# Patient Record
Sex: MALE | Race: WHITE | NOT HISPANIC OR LATINO | URBAN - METROPOLITAN AREA
[De-identification: names, ages, dates, MRNs, and addresses within clinical notes are randomized per-mention and may not be internally consistent; named-entity substitution may affect disease eponyms.]

---

## 2017-11-03 ENCOUNTER — AMBULATORY SURGICAL CENTER (OUTPATIENT)
Dept: URBAN - METROPOLITAN AREA SURGERY 17 | Facility: SURGERY | Age: 62
End: 2017-11-03
Payer: COMMERCIAL

## 2017-11-03 ENCOUNTER — OFFICE (OUTPATIENT)
Dept: URBAN - METROPOLITAN AREA CLINIC 64 | Facility: CLINIC | Age: 62
End: 2017-11-03

## 2017-11-03 VITALS
OXYGEN SATURATION: 95 % | DIASTOLIC BLOOD PRESSURE: 46 MMHG | HEART RATE: 66 BPM | SYSTOLIC BLOOD PRESSURE: 70 MMHG | SYSTOLIC BLOOD PRESSURE: 97 MMHG | DIASTOLIC BLOOD PRESSURE: 41 MMHG | DIASTOLIC BLOOD PRESSURE: 76 MMHG | DIASTOLIC BLOOD PRESSURE: 44 MMHG | RESPIRATION RATE: 18 BRPM | OXYGEN SATURATION: 94 % | DIASTOLIC BLOOD PRESSURE: 53 MMHG | RESPIRATION RATE: 17 BRPM | SYSTOLIC BLOOD PRESSURE: 65 MMHG | HEART RATE: 68 BPM | OXYGEN SATURATION: 97 % | DIASTOLIC BLOOD PRESSURE: 68 MMHG | RESPIRATION RATE: 14 BRPM | SYSTOLIC BLOOD PRESSURE: 73 MMHG | HEART RATE: 58 BPM | SYSTOLIC BLOOD PRESSURE: 99 MMHG | WEIGHT: 185 LBS | OXYGEN SATURATION: 96 % | HEART RATE: 64 BPM | RESPIRATION RATE: 16 BRPM | TEMPERATURE: 98.1 F | OXYGEN SATURATION: 93 % | SYSTOLIC BLOOD PRESSURE: 135 MMHG | DIASTOLIC BLOOD PRESSURE: 57 MMHG | HEART RATE: 74 BPM | DIASTOLIC BLOOD PRESSURE: 50 MMHG | SYSTOLIC BLOOD PRESSURE: 75 MMHG | HEART RATE: 60 BPM | OXYGEN SATURATION: 98 % | SYSTOLIC BLOOD PRESSURE: 90 MMHG | RESPIRATION RATE: 22 BRPM | DIASTOLIC BLOOD PRESSURE: 78 MMHG | DIASTOLIC BLOOD PRESSURE: 62 MMHG | TEMPERATURE: 98 F | SYSTOLIC BLOOD PRESSURE: 86 MMHG | DIASTOLIC BLOOD PRESSURE: 65 MMHG | DIASTOLIC BLOOD PRESSURE: 58 MMHG | OXYGEN SATURATION: 92 % | HEART RATE: 69 BPM | HEIGHT: 68 IN | HEART RATE: 63 BPM | SYSTOLIC BLOOD PRESSURE: 92 MMHG | RESPIRATION RATE: 19 BRPM | RESPIRATION RATE: 20 BRPM | HEART RATE: 67 BPM | HEART RATE: 54 BPM | SYSTOLIC BLOOD PRESSURE: 125 MMHG | HEART RATE: 65 BPM

## 2017-11-03 DIAGNOSIS — K20.9 ESOPHAGITIS, UNSPECIFIED: ICD-10-CM

## 2017-11-03 DIAGNOSIS — K44.9 DIAPHRAGMATIC HERNIA WITHOUT OBSTRUCTION OR GANGRENE: ICD-10-CM

## 2017-11-03 DIAGNOSIS — Z12.11 ENCOUNTER FOR SCREENING FOR MALIGNANT NEOPLASM OF COLON: ICD-10-CM

## 2017-11-03 DIAGNOSIS — K22.70 BARRETT'S ESOPHAGUS WITHOUT DYSPLASIA: ICD-10-CM

## 2017-11-03 DIAGNOSIS — K57.30 DIVERTICULOSIS OF LARGE INTESTINE WITHOUT PERFORATION OR ABS: ICD-10-CM

## 2017-11-03 DIAGNOSIS — R13.10 DYSPHAGIA, UNSPECIFIED: ICD-10-CM

## 2017-11-03 DIAGNOSIS — K64.0 FIRST DEGREE HEMORRHOIDS: ICD-10-CM

## 2017-11-03 DIAGNOSIS — K21.9 GASTRO-ESOPHAGEAL REFLUX DISEASE WITHOUT ESOPHAGITIS: ICD-10-CM

## 2017-11-03 DIAGNOSIS — K25.9 GASTRIC ULCER, UNSPECIFIED AS ACUTE OR CHRONIC, WITHOUT HEMO: ICD-10-CM

## 2017-11-03 PROBLEM — R19.4 CHANGE IN BOWEL HABITS: Status: ACTIVE | Noted: 2017-11-03

## 2017-11-03 LAB
GI HISTOLOGY: A. UNSPECIFIED: (no result)
GI HISTOLOGY: B. UNSPECIFIED: (no result)
GI HISTOLOGY: C. UNSPECIFIED: (no result)
GI HISTOLOGY: D. SELECT: (no result)
GI HISTOLOGY: PDF REPORT: (no result)

## 2017-11-03 PROCEDURE — 45380 COLONOSCOPY AND BIOPSY: CPT | Performed by: INTERNAL MEDICINE

## 2017-11-03 PROCEDURE — 88305 TISSUE EXAM BY PATHOLOGIST: CPT | Performed by: INTERNAL MEDICINE

## 2017-11-03 PROCEDURE — 43239 EGD BIOPSY SINGLE/MULTIPLE: CPT | Mod: 59 | Performed by: INTERNAL MEDICINE

## 2017-11-03 RX ORDER — OMEPRAZOLE 20 MG/1
CAPSULE, DELAYED RELEASE ORAL
Qty: 90 | Refills: 3 | Status: ACTIVE

## 2017-11-03 RX ADMIN — LIDOCAINE HYDROCHLORIDE 50 MG: 10 INJECTION, SOLUTION EPIDURAL; INFILTRATION; INTRACAUDAL; PERINEURAL at 08:26

## 2017-11-03 RX ADMIN — PROPOFOL 25 MG: 10 INJECTION, EMULSION INTRAVENOUS at 08:36

## 2017-11-03 RX ADMIN — PROPOFOL 25 MG: 10 INJECTION, EMULSION INTRAVENOUS at 08:39

## 2017-11-03 RX ADMIN — PROPOFOL 25 MG: 10 INJECTION, EMULSION INTRAVENOUS at 08:46

## 2017-11-03 RX ADMIN — SODIUM CHLORIDE 800 ML: 900 IRRIGANT IRRIGATION at 08:49

## 2017-11-03 RX ADMIN — PROPOFOL 25 MG: 10 INJECTION, EMULSION INTRAVENOUS at 08:26

## 2017-11-03 RX ADMIN — PROPOFOL 25 MG: 10 INJECTION, EMULSION INTRAVENOUS at 08:30

## 2017-11-03 RX ADMIN — PROPOFOL 25 MG: 10 INJECTION, EMULSION INTRAVENOUS at 08:43

## 2017-11-03 RX ADMIN — PROPOFOL 25 MG: 10 INJECTION, EMULSION INTRAVENOUS at 08:32

## 2017-11-03 RX ADMIN — PROPOFOL 50 MG: 10 INJECTION, EMULSION INTRAVENOUS at 08:26

## 2017-11-03 NOTE — SERVICEHPINOTES
bypass existing patient for 10 year screening.  new patient forms reviewed and verified. Patient also mentions change in bowel habits, diarrhea at times and esophageal dysphagia. He states he was told he has some cervical osteophytes based on imaging,,,dysphagia has been a issue now over the past 1.5 yrs. He uses H2B longstanding and has not used PPI tx in the past.

## 2017-11-03 NOTE — SERVICENOTES
Patient understands associated risk, benefit of endoscopy including bleeding, infection, missed polyp or missed cancer, perforation, missed lesion, death. Withdrawal time was > / = 6 minutes.

Patient to call for bx's given diarrhea concerns,,,will start PPI and see how GERD and dysphagia responds. May need to consider barium swallow with tablet if persistent dysphagia and concern for cervical involvement,,,,today,,,his cervical esophagus appears patent and very easy to pass with scope. *********

## 2018-03-19 ENCOUNTER — OFFICE (OUTPATIENT)
Dept: URBAN - METROPOLITAN AREA CLINIC 75 | Facility: CLINIC | Age: 63
End: 2018-03-19

## 2018-03-19 VITALS
HEIGHT: 68 IN | SYSTOLIC BLOOD PRESSURE: 128 MMHG | HEART RATE: 86 BPM | WEIGHT: 185 LBS | DIASTOLIC BLOOD PRESSURE: 84 MMHG

## 2018-03-19 DIAGNOSIS — R13.10 DYSPHAGIA, UNSPECIFIED: ICD-10-CM

## 2018-03-19 DIAGNOSIS — K21.9 GASTRO-ESOPHAGEAL REFLUX DISEASE WITHOUT ESOPHAGITIS: ICD-10-CM

## 2018-03-19 DIAGNOSIS — K22.70 BARRETT'S ESOPHAGUS WITHOUT DYSPLASIA: ICD-10-CM

## 2018-03-19 PROCEDURE — 99213 OFFICE O/P EST LOW 20 MIN: CPT | Performed by: INTERNAL MEDICINE

## 2018-11-23 ENCOUNTER — TRANSCRIBE ORDERS (OUTPATIENT)
Dept: ADMINISTRATIVE | Facility: HOSPITAL | Age: 63
End: 2018-11-23

## 2018-11-27 ENCOUNTER — TRANSCRIBE ORDERS (OUTPATIENT)
Dept: ADMINISTRATIVE | Facility: HOSPITAL | Age: 63
End: 2018-11-27

## 2018-11-27 DIAGNOSIS — R07.9 CHEST PAIN, UNSPECIFIED TYPE: Primary | ICD-10-CM

## 2018-11-30 ENCOUNTER — HOSPITAL ENCOUNTER (OUTPATIENT)
Dept: NUCLEAR MEDICINE | Facility: HOSPITAL | Age: 63
Discharge: HOME | End: 2018-11-30

## 2018-11-30 DIAGNOSIS — R07.9 CHEST PAIN, UNSPECIFIED TYPE: ICD-10-CM

## 2018-11-30 PROCEDURE — 78452 HT MUSCLE IMAGE SPECT MULT: CPT | Performed by: INTERNAL MEDICINE

## 2018-11-30 PROCEDURE — 0 TECHNETIUM SESTAMIBI: Performed by: INTERNAL MEDICINE

## 2018-11-30 PROCEDURE — 93017 CV STRESS TEST TRACING ONLY: CPT

## 2018-11-30 PROCEDURE — A9500 TC99M SESTAMIBI: HCPCS | Performed by: INTERNAL MEDICINE

## 2018-11-30 PROCEDURE — 78452 HT MUSCLE IMAGE SPECT MULT: CPT

## 2018-11-30 PROCEDURE — 93018 CV STRESS TEST I&R ONLY: CPT | Performed by: INTERNAL MEDICINE

## 2018-11-30 RX ORDER — ALLOPURINOL 300 MG/1
300 TABLET ORAL DAILY
COMMUNITY

## 2018-11-30 RX ORDER — DOXAZOSIN 8 MG/1
8 TABLET ORAL NIGHTLY
COMMUNITY

## 2018-11-30 RX ORDER — OMEPRAZOLE 20 MG/1
20 CAPSULE, DELAYED RELEASE ORAL DAILY
COMMUNITY

## 2018-11-30 RX ORDER — SIMVASTATIN 40 MG
40 TABLET ORAL NIGHTLY
COMMUNITY
End: 2019-12-31

## 2018-11-30 RX ORDER — OLMESARTAN MEDOXOMIL 20 MG/1
20 TABLET ORAL DAILY
COMMUNITY
End: 2019-12-31

## 2018-11-30 RX ORDER — METOPROLOL TARTRATE 100 MG/1
100 TABLET ORAL 2 TIMES DAILY
COMMUNITY

## 2018-11-30 RX ADMIN — TECHNETIUM TC 99M SESTAMIBI 1 DOSE: 1 INJECTION INTRAVENOUS at 07:48

## 2018-11-30 RX ADMIN — TECHNETIUM TC 99M SESTAMIBI 1 DOSE: 1 INJECTION INTRAVENOUS at 09:02

## 2018-12-01 LAB
BH CV STRESS BP STAGE 1: NORMAL
BH CV STRESS BP STAGE 2: NORMAL
BH CV STRESS DURATION MIN STAGE 1: 3
BH CV STRESS DURATION MIN STAGE 2: 2
BH CV STRESS DURATION SEC STAGE 1: 0
BH CV STRESS DURATION SEC STAGE 2: 7
BH CV STRESS GRADE STAGE 1: 10
BH CV STRESS GRADE STAGE 2: 12
BH CV STRESS HR STAGE 1: 132
BH CV STRESS HR STAGE 2: 149
BH CV STRESS METS STAGE 1: 5
BH CV STRESS METS STAGE 2: 7.5
BH CV STRESS PROTOCOL 1: NORMAL
BH CV STRESS RECOVERY BP: NORMAL MMHG
BH CV STRESS RECOVERY HR: 87 BPM
BH CV STRESS SPEED STAGE 1: 1.7
BH CV STRESS SPEED STAGE 2: 2.5
BH CV STRESS STAGE 1: 1
BH CV STRESS STAGE 2: 2
LV EF NUC BP: 60 %
MAXIMAL PREDICTED HEART RATE: 155 BPM
PERCENT MAX PREDICTED HR: 96.77 %
STRESS BASELINE BP: NORMAL MMHG
STRESS BASELINE HR: 76 BPM
STRESS PERCENT HR: 114 %
STRESS POST ESTIMATED WORKLOAD: 6.4 METS
STRESS POST EXERCISE DUR MIN: 5 MIN
STRESS POST EXERCISE DUR SEC: 7 SEC
STRESS POST PEAK BP: NORMAL MMHG
STRESS POST PEAK HR: 150 BPM
STRESS TARGET HR: 132 BPM

## 2018-12-13 ENCOUNTER — TRANSCRIBE ORDERS (OUTPATIENT)
Dept: ADMINISTRATIVE | Facility: HOSPITAL | Age: 63
End: 2018-12-13

## 2018-12-13 ENCOUNTER — OFFICE VISIT (OUTPATIENT)
Dept: NEUROSURGERY | Facility: CLINIC | Age: 63
End: 2018-12-13

## 2018-12-13 VITALS
BODY MASS INDEX: 28.19 KG/M2 | WEIGHT: 186 LBS | HEIGHT: 68 IN | HEART RATE: 72 BPM | SYSTOLIC BLOOD PRESSURE: 111 MMHG | DIASTOLIC BLOOD PRESSURE: 66 MMHG

## 2018-12-13 DIAGNOSIS — M25.78 OSTEOPHYTE OF CERVICAL SPINE: ICD-10-CM

## 2018-12-13 DIAGNOSIS — R13.13 PHARYNGEAL DYSPHAGIA: Primary | ICD-10-CM

## 2018-12-13 DIAGNOSIS — R31.29 MICROSCOPIC HEMATURIA: Primary | ICD-10-CM

## 2018-12-13 PROCEDURE — 99203 OFFICE O/P NEW LOW 30 MIN: CPT | Performed by: PHYSICIAN ASSISTANT

## 2018-12-13 NOTE — PROGRESS NOTES
Subjective   Patient ID: Mino Aguila III is a 63 y.o. male is being seen for consultation today at the request of Divine Bruno MD for difficulty swallowing.  He states in 2016 he aspirated a pill and had a swallow study completed.  Since then, he has had trouble swallowing.     Difficulty Swallowing   This is a chronic problem. The current episode started more than 1 year ago. The problem occurs constantly. The problem has been gradually worsening. Associated symptoms include congestion, coughing and diaphoresis. Pertinent negatives include no chills, fever, headaches, neck pain, numbness, rash, sore throat, swollen glands, vomiting or weakness. The symptoms are aggravated by eating and swallowing. He has tried position changes for the symptoms. The treatment provided no relief.       The following portions of the patient's history were reviewed and updated as appropriate: allergies, current medications, past family history, past medical history, past social history, past surgical history and problem list.    Review of Systems   Constitutional: Positive for activity change and diaphoresis. Negative for chills and fever.   HENT: Positive for congestion. Negative for sore throat.    Respiratory: Positive for cough and choking.    Gastrointestinal: Negative for vomiting.   Genitourinary: Positive for frequency, hematuria and urgency. Negative for difficulty urinating.   Musculoskeletal: Positive for neck stiffness. Negative for neck pain.   Skin: Negative for rash.   Neurological: Negative for speech difficulty, weakness, numbness and headaches.   All other systems reviewed and are negative.      Objective   Physical Exam   Constitutional: He is oriented to person, place, and time. He appears well-developed and well-nourished.   HENT:   Head: Normocephalic and atraumatic.   Right Ear: External ear normal.   Left Ear: External ear normal.   Eyes: Conjunctivae and EOM are normal. Pupils are equal, round, and  reactive to light. Right eye exhibits no discharge. Left eye exhibits no discharge.   Neck: Normal range of motion. Neck supple. No tracheal deviation present.   Pulmonary/Chest: Effort normal. No stridor. No respiratory distress.   Abdominal: Soft.   Musculoskeletal: Normal range of motion. He exhibits no edema, tenderness or deformity.   Neurological: He is alert and oriented to person, place, and time. He has normal strength and normal reflexes. He displays no atrophy, no tremor and normal reflexes. No cranial nerve deficit or sensory deficit. He exhibits normal muscle tone. He displays a negative Romberg sign. He displays no seizure activity. Coordination and gait normal.   No long tract signs   Skin: Skin is warm and dry.   Psychiatric: He has a normal mood and affect. His behavior is normal. Judgment and thought content normal.   Nursing note and vitals reviewed.    Neurologic Exam     Mental Status   Oriented to person, place, and time.     Cranial Nerves     CN III, IV, VI   Pupils are equal, round, and reactive to light.  Extraocular motions are normal.     Motor Exam     Strength   Strength 5/5 throughout.       Assessment/Plan   Independent Review of Radiographic Studies:    I did review the video swallow that was done in 2016 which showed moderate oral pharyngeal dysphasia secondary to a large anterior osteophyte at C3 C4 C4 C5 C5 C6  Medical Decision Making:    Mr. Aguila referred to us by Dr. Bruno for a 2-3 year history of dysphagia.  The patient states that he began noticing some difficulty swallowing at least 3 years ago.  In 2016 he had a particularly difficult episode where he aspirated a vitamin that prompted him to get the swallow study discussed above.  He was never referred to any surgeon at the time.  Over the past 2 years his symptoms have progressed and he now has much more difficulty particularly with any dry foods or knee.  He frequently regurgitate food and has significant coughing  episodes after eating.  He has some difficulty with liquids but much less so than solid food.  He has occasional neck soreness but no neck pain or arm pain or numbness or tingling.  No areas of weakness or gait difficulties or speech issues or numbness or tingling.  He has not had any recent imaging or follow-up swallow evaluations.    He has a history of hypertension and recently had some chest pain.  He had a stress test on November 30, 2018 that was unremarkable.  He just this week had an episode of hematuria and his PCP has referred him to urology for further evaluation.  Otherwise his medical history is only significant for melanoma that was removed from his chin many years ago.    I will send him for a follow-up video swallow evaluation to determine how severe the dysphagia is at this point.  In addition I will send him for a cervical CT to evaluate for cervical osteophytes and have him follow up thereafter to discuss possible surgery with Dr. Rose.     Mino was seen today for neck pain and difficulty swallowing.    Diagnoses and all orders for this visit:    Pharyngeal dysphagia  -     CT Cervical Spine Without Contrast; Future  -     FL Video Swallow With Speech; Future    Osteophyte of cervical spine  -     CT Cervical Spine Without Contrast; Future  -     FL Video Swallow With Speech; Future      Return for follow up after radiology test with Dr Rose.

## 2018-12-18 ENCOUNTER — HOSPITAL ENCOUNTER (EMERGENCY)
Facility: HOSPITAL | Age: 63
Discharge: HOME | End: 2018-12-18
Attending: EMERGENCY MEDICINE | Admitting: EMERGENCY MEDICINE

## 2018-12-18 ENCOUNTER — APPOINTMENT (OUTPATIENT)
Dept: CT IMAGING | Facility: HOSPITAL | Age: 63
End: 2018-12-18

## 2018-12-18 VITALS
HEART RATE: 64 BPM | OXYGEN SATURATION: 95 % | RESPIRATION RATE: 16 BRPM | TEMPERATURE: 98.6 F | HEIGHT: 68 IN | DIASTOLIC BLOOD PRESSURE: 87 MMHG | WEIGHT: 185 LBS | BODY MASS INDEX: 28.04 KG/M2 | SYSTOLIC BLOOD PRESSURE: 142 MMHG

## 2018-12-18 DIAGNOSIS — R31.9 HEMATURIA, UNSPECIFIED TYPE: ICD-10-CM

## 2018-12-18 DIAGNOSIS — IMO0001: Primary | ICD-10-CM

## 2018-12-18 LAB
ALBUMIN SERPL-MCNC: 4.3 G/DL (ref 3.5–5.2)
ALBUMIN/GLOB SERPL: 1.4 G/DL
ALP SERPL-CCNC: 112 U/L (ref 39–117)
ALT SERPL W P-5'-P-CCNC: 24 U/L (ref 1–41)
ANION GAP SERPL CALCULATED.3IONS-SCNC: 9.6 MMOL/L
AST SERPL-CCNC: 21 U/L (ref 1–40)
BACTERIA UR QL AUTO: ABNORMAL /HPF
BASOPHILS # BLD AUTO: 0.01 10*3/MM3 (ref 0–0.2)
BASOPHILS NFR BLD AUTO: 0.1 % (ref 0–1.5)
BILIRUB SERPL-MCNC: 0.6 MG/DL (ref 0.1–1.2)
BILIRUB UR QL STRIP: NEGATIVE
BUN BLD-MCNC: 16 MG/DL (ref 8–23)
BUN/CREAT SERPL: 14.5 (ref 7–25)
CALCIUM SPEC-SCNC: 9.3 MG/DL (ref 8.6–10.5)
CHLORIDE SERPL-SCNC: 103 MMOL/L (ref 98–107)
CLARITY UR: ABNORMAL
CO2 SERPL-SCNC: 26.4 MMOL/L (ref 22–29)
COLOR UR: ABNORMAL
CREAT BLD-MCNC: 1.1 MG/DL (ref 0.76–1.27)
DEPRECATED RDW RBC AUTO: 43.5 FL (ref 37–54)
EOSINOPHIL # BLD AUTO: 0.08 10*3/MM3 (ref 0–0.7)
EOSINOPHIL NFR BLD AUTO: 1 % (ref 0.3–6.2)
ERYTHROCYTE [DISTWIDTH] IN BLOOD BY AUTOMATED COUNT: 13.5 % (ref 11.5–14.5)
GFR SERPL CREATININE-BSD FRML MDRD: 67 ML/MIN/1.73
GLOBULIN UR ELPH-MCNC: 3.1 GM/DL
GLUCOSE BLD-MCNC: 110 MG/DL (ref 65–99)
GLUCOSE UR STRIP-MCNC: NEGATIVE MG/DL
HCT VFR BLD AUTO: 40.8 % (ref 40.4–52.2)
HGB BLD-MCNC: 14 G/DL (ref 13.7–17.6)
HGB UR QL STRIP.AUTO: ABNORMAL
HOLD SPECIMEN: NORMAL
HOLD SPECIMEN: NORMAL
HYALINE CASTS UR QL AUTO: ABNORMAL /LPF
IMM GRANULOCYTES # BLD: 0.03 10*3/MM3 (ref 0–0.03)
IMM GRANULOCYTES NFR BLD: 0.4 % (ref 0–0.5)
KETONES UR QL STRIP: NEGATIVE
LEUKOCYTE ESTERASE UR QL STRIP.AUTO: ABNORMAL
LYMPHOCYTES # BLD AUTO: 1.14 10*3/MM3 (ref 0.9–4.8)
LYMPHOCYTES NFR BLD AUTO: 14.4 % (ref 19.6–45.3)
MCH RBC QN AUTO: 30.1 PG (ref 27–32.7)
MCHC RBC AUTO-ENTMCNC: 34.3 G/DL (ref 32.6–36.4)
MCV RBC AUTO: 87.7 FL (ref 79.8–96.2)
MONOCYTES # BLD AUTO: 0.31 10*3/MM3 (ref 0.2–1.2)
MONOCYTES NFR BLD AUTO: 3.9 % (ref 5–12)
NEUTROPHILS # BLD AUTO: 6.39 10*3/MM3 (ref 1.9–8.1)
NEUTROPHILS NFR BLD AUTO: 80.6 % (ref 42.7–76)
NITRITE UR QL STRIP: NEGATIVE
PH UR STRIP.AUTO: 6.5 [PH] (ref 5–8)
PLATELET # BLD AUTO: 173 10*3/MM3 (ref 140–500)
PMV BLD AUTO: 10.6 FL (ref 6–12)
POTASSIUM BLD-SCNC: 4.3 MMOL/L (ref 3.5–5.2)
PROT SERPL-MCNC: 7.4 G/DL (ref 6–8.5)
PROT UR QL STRIP: NEGATIVE
RBC # BLD AUTO: 4.65 10*6/MM3 (ref 4.6–6)
RBC # UR: ABNORMAL /HPF
REF LAB TEST METHOD: ABNORMAL
SODIUM BLD-SCNC: 139 MMOL/L (ref 136–145)
SP GR UR STRIP: 1.02 (ref 1–1.03)
SQUAMOUS #/AREA URNS HPF: ABNORMAL /HPF
UROBILINOGEN UR QL STRIP: ABNORMAL
WBC NRBC COR # BLD: 7.93 10*3/MM3 (ref 4.5–10.7)
WBC UR QL AUTO: ABNORMAL /HPF
WHOLE BLOOD HOLD SPECIMEN: NORMAL
WHOLE BLOOD HOLD SPECIMEN: NORMAL

## 2018-12-18 PROCEDURE — 96374 THER/PROPH/DIAG INJ IV PUSH: CPT

## 2018-12-18 PROCEDURE — 99284 EMERGENCY DEPT VISIT MOD MDM: CPT

## 2018-12-18 PROCEDURE — 96375 TX/PRO/DX INJ NEW DRUG ADDON: CPT

## 2018-12-18 PROCEDURE — 96361 HYDRATE IV INFUSION ADD-ON: CPT

## 2018-12-18 PROCEDURE — 87086 URINE CULTURE/COLONY COUNT: CPT | Performed by: EMERGENCY MEDICINE

## 2018-12-18 PROCEDURE — 96376 TX/PRO/DX INJ SAME DRUG ADON: CPT

## 2018-12-18 PROCEDURE — 25010000002 KETOROLAC TROMETHAMINE PER 15 MG: Performed by: EMERGENCY MEDICINE

## 2018-12-18 PROCEDURE — 25010000002 ONDANSETRON PER 1 MG: Performed by: EMERGENCY MEDICINE

## 2018-12-18 PROCEDURE — 80053 COMPREHEN METABOLIC PANEL: CPT | Performed by: EMERGENCY MEDICINE

## 2018-12-18 PROCEDURE — 74176 CT ABD & PELVIS W/O CONTRAST: CPT

## 2018-12-18 PROCEDURE — 85025 COMPLETE CBC W/AUTO DIFF WBC: CPT | Performed by: EMERGENCY MEDICINE

## 2018-12-18 PROCEDURE — 81001 URINALYSIS AUTO W/SCOPE: CPT | Performed by: EMERGENCY MEDICINE

## 2018-12-18 PROCEDURE — 25010000002 HYDROMORPHONE PER 4 MG: Performed by: EMERGENCY MEDICINE

## 2018-12-18 RX ORDER — SODIUM CHLORIDE 9 MG/ML
125 INJECTION, SOLUTION INTRAVENOUS CONTINUOUS
Status: DISCONTINUED | OUTPATIENT
Start: 2018-12-18 | End: 2018-12-18 | Stop reason: HOSPADM

## 2018-12-18 RX ORDER — ONDANSETRON 2 MG/ML
4 INJECTION INTRAMUSCULAR; INTRAVENOUS ONCE
Status: COMPLETED | OUTPATIENT
Start: 2018-12-18 | End: 2018-12-18

## 2018-12-18 RX ORDER — SODIUM CHLORIDE 0.9 % (FLUSH) 0.9 %
10 SYRINGE (ML) INJECTION AS NEEDED
Status: DISCONTINUED | OUTPATIENT
Start: 2018-12-18 | End: 2018-12-18 | Stop reason: HOSPADM

## 2018-12-18 RX ORDER — ONDANSETRON 4 MG/1
4 TABLET, ORALLY DISINTEGRATING ORAL EVERY 6 HOURS PRN
Qty: 15 TABLET | Refills: 0 | Status: SHIPPED | OUTPATIENT
Start: 2018-12-18 | End: 2019-12-31

## 2018-12-18 RX ORDER — KETOROLAC TROMETHAMINE 15 MG/ML
15 INJECTION, SOLUTION INTRAMUSCULAR; INTRAVENOUS ONCE
Status: COMPLETED | OUTPATIENT
Start: 2018-12-18 | End: 2018-12-18

## 2018-12-18 RX ORDER — HYDROMORPHONE HYDROCHLORIDE 1 MG/ML
0.5 INJECTION, SOLUTION INTRAMUSCULAR; INTRAVENOUS; SUBCUTANEOUS ONCE
Status: COMPLETED | OUTPATIENT
Start: 2018-12-18 | End: 2018-12-18

## 2018-12-18 RX ORDER — HYDROCODONE BITARTRATE AND ACETAMINOPHEN 5; 325 MG/1; MG/1
1 TABLET ORAL EVERY 6 HOURS PRN
Qty: 20 TABLET | Refills: 0 | Status: SHIPPED | OUTPATIENT
Start: 2018-12-18 | End: 2019-12-31

## 2018-12-18 RX ORDER — OMEGA-3-ACID ETHYL ESTERS 1 G/1
4 CAPSULE, LIQUID FILLED ORAL DAILY
COMMUNITY
End: 2019-12-31

## 2018-12-18 RX ADMIN — ONDANSETRON 4 MG: 2 INJECTION INTRAMUSCULAR; INTRAVENOUS at 12:49

## 2018-12-18 RX ADMIN — SODIUM CHLORIDE 125 ML/HR: 9 INJECTION, SOLUTION INTRAVENOUS at 10:33

## 2018-12-18 RX ADMIN — SODIUM CHLORIDE 500 ML: 9 INJECTION, SOLUTION INTRAVENOUS at 10:32

## 2018-12-18 RX ADMIN — HYDROMORPHONE HYDROCHLORIDE 0.5 MG: 1 INJECTION, SOLUTION INTRAMUSCULAR; INTRAVENOUS; SUBCUTANEOUS at 12:53

## 2018-12-18 RX ADMIN — KETOROLAC TROMETHAMINE 15 MG: 15 INJECTION, SOLUTION INTRAMUSCULAR; INTRAVENOUS at 10:38

## 2018-12-18 RX ADMIN — ONDANSETRON 4 MG: 2 INJECTION INTRAMUSCULAR; INTRAVENOUS at 10:35

## 2018-12-18 RX ADMIN — HYDROMORPHONE HYDROCHLORIDE 0.5 MG: 1 INJECTION, SOLUTION INTRAMUSCULAR; INTRAVENOUS; SUBCUTANEOUS at 10:41

## 2018-12-18 NOTE — ED PROVIDER NOTES
"EMERGENCY DEPARTMENT ENCOUNTER    CHIEF COMPLAINT  Chief Complaint: abdominal pain   History given by: patient, family  History limited by: N/A  Room Number: 25/25  PMD: Divine Bruno MD      HPI:  Pt is a 63 y.o. male who has hx of \"slight enlarged prostate\". He presents to the ED c/o intermittent suprapubic abd pain that started this morning. It has no aggravating factors and no alleviating factors. He reports that it has been accompanied by nausea and vomiting. He also reports having hematuria for about 1 week. He denies pain and difficulty with urination, diarrhea, flank pain, back pain, chest pain, dyspnea, fever, and chills. Per family, pt saw PMD for the hematuria last week, underwent UA with urine culture, was arranged to undergo bilateral renal ultrasound in the near future, and has been scheduled to see Dr Call (urologist) on 12/31/18. He is not on any blood thinners. Pt has no other complaints at this time.     Duration: started this morning  Timing: intermittent  Location: suprapubic abdomen  Radiation: none  Quality: pain  Intensity/Severity: moderate  Progression: unchanged  Associated Symptoms: nausea, vomiting, hematuria  Aggravating Factors: none  Alleviating Factors: none  Previous Episodes: none  Treatment before arrival: none mentioned        PAST MEDICAL HISTORY  Active Ambulatory Problems     Diagnosis Date Noted   • Pharyngeal dysphagia 12/13/2018   • Osteophyte of cervical spine 12/13/2018     Resolved Ambulatory Problems     Diagnosis Date Noted   • No Resolved Ambulatory Problems     Past Medical History:   Diagnosis Date   • Cancer (CMS/HCC)    • Enlarged prostate    • GERD (gastroesophageal reflux disease)    • Gout    • Gout 1988   • Hyperlipidemia    • Hypertension        PAST SURGICAL HISTORY  Past Surgical History:   Procedure Laterality Date   • BUNIONECTOMY     • CARPAL TUNNEL RELEASE Bilateral 1988   • FOOT SURGERY  2006   • PAROTIDECTOMY Left 1992    NO COMPLICATIONS   • " REPLACEMENT TOTAL KNEE Left 2008   • ROTATOR CUFF REPAIR Left 1992    NO COMPLICATIONS   • ROTATOR CUFF REPAIR Right 2011       FAMILY HISTORY  Family History   Problem Relation Age of Onset   • Emphysema Mother    • Lung cancer Father        SOCIAL HISTORY  Social History     Socioeconomic History   • Marital status:      Spouse name: Not on file   • Number of children: 1   • Years of education: 16   • Highest education level: Associate degree: academic program   Social Needs   • Financial resource strain: Patient refused   • Food insecurity - worry: Patient refused   • Food insecurity - inability: Patient refused   • Transportation needs - medical: Patient refused   • Transportation needs - non-medical: Patient refused   Occupational History   • Occupation: RETIRED   Tobacco Use   • Smoking status: Never Smoker   • Smokeless tobacco: Never Used   Substance and Sexual Activity   • Alcohol use: Yes     Frequency: Never     Comment: socially   • Drug use: No   • Sexual activity: Defer   Other Topics Concern   • Not on file   Social History Narrative    LIVES WITH SPOUSE         ALLERGIES  Patient has no known allergies.        REVIEW OF SYSTEMS  Review of Systems   Constitutional: Negative for chills and fever.   HENT: Negative for congestion, rhinorrhea and sore throat.    Eyes: Negative for pain.   Respiratory: Negative for cough and shortness of breath.    Cardiovascular: Negative for chest pain and palpitations.   Gastrointestinal: Positive for abdominal pain (suprapubic abd pain), nausea and vomiting. Negative for diarrhea.   Endocrine: Negative.    Genitourinary: Positive for hematuria. Negative for difficulty urinating, dysuria and flank pain.   Musculoskeletal: Negative for myalgias.   Skin: Negative.    Neurological: Negative for speech difficulty, weakness, numbness and headaches.   Psychiatric/Behavioral: Negative.    All other systems reviewed and are negative.          PHYSICAL EXAM  ED Triage  Vitals   Temp Heart Rate Resp BP SpO2   12/18/18 1008 12/18/18 1008 12/18/18 1008 12/18/18 1021 12/18/18 1008   98.2 °F (36.8 °C) 76 16 160/91 97 % WNL      Temp src Heart Rate Source Patient Position BP Location FiO2 (%)   12/18/18 1008 12/18/18 1008 -- 12/18/18 1021 --   Tympanic Monitor  Right arm      Physical Exam   Constitutional: He is oriented to person, place, and time. No distress.   HENT:   Head: Normocephalic.   Mouth/Throat: Mucous membranes are normal.   Eyes: EOM are normal. Pupils are equal, round, and reactive to light.   Neck: Normal range of motion. Neck supple.   Cardiovascular: Normal rate, regular rhythm and normal heart sounds.   Pulmonary/Chest: Effort normal and breath sounds normal. No respiratory distress. He has no decreased breath sounds. He has no wheezes. He has no rhonchi. He has no rales.   Abdominal: Soft. There is tenderness in the suprapubic area. There is no rebound, no guarding and no CVA tenderness.   Musculoskeletal: Normal range of motion.   Neurological: He is alert and oriented to person, place, and time. He has normal motor skills and normal sensation.   Skin: Skin is warm and dry.   Psychiatric: Mood and affect normal.   Nursing note and vitals reviewed.          LAB RESULTS  Recent Results (from the past 24 hour(s))   Urinalysis With Culture If Indicated - Urine, Clean Catch    Collection Time: 12/18/18 10:22 AM   Result Value Ref Range    Color, UA Other (A) Yellow, Straw    Appearance, UA Cloudy (A) Clear    pH, UA 6.5 5.0 - 8.0    Specific Gravity, UA 1.019 1.005 - 1.030    Glucose, UA Negative Negative    Ketones, UA Negative Negative    Bilirubin, UA Negative Negative    Blood, UA Large (3+) (A) Negative    Protein, UA Negative Negative    Leuk Esterase, UA Trace (A) Negative    Nitrite, UA Negative Negative    Urobilinogen, UA 0.2 E.U./dL 0.2 - 1.0 E.U./dL   Urinalysis, Microscopic Only - Urine, Clean Catch    Collection Time: 12/18/18 10:22 AM   Result Value Ref  Range    RBC, UA Too Numerous to Count (A) None Seen, 0-2 /HPF    WBC, UA Unable to determine due to loaded field (A) None Seen, 0-2 /HPF    Bacteria, UA Unable to determine due to loaded field (A) None Seen /HPF    Squamous Epithelial Cells, UA Unable to determine due to loaded field (A) None Seen, 0-2 /HPF    Hyaline Casts, UA Unable to determine due to loaded field None Seen /LPF    Methodology Manual Light Microscopy    Comprehensive Metabolic Panel    Collection Time: 12/18/18 10:32 AM   Result Value Ref Range    Glucose 110 (H) 65 - 99 mg/dL    BUN 16 8 - 23 mg/dL    Creatinine 1.10 0.76 - 1.27 mg/dL    Sodium 139 136 - 145 mmol/L    Potassium 4.3 3.5 - 5.2 mmol/L    Chloride 103 98 - 107 mmol/L    CO2 26.4 22.0 - 29.0 mmol/L    Calcium 9.3 8.6 - 10.5 mg/dL    Total Protein 7.4 6.0 - 8.5 g/dL    Albumin 4.30 3.50 - 5.20 g/dL    ALT (SGPT) 24 1 - 41 U/L    AST (SGOT) 21 1 - 40 U/L    Alkaline Phosphatase 112 39 - 117 U/L    Total Bilirubin 0.6 0.1 - 1.2 mg/dL    eGFR Non African Amer 67 >60 mL/min/1.73    Globulin 3.1 gm/dL    A/G Ratio 1.4 g/dL    BUN/Creatinine Ratio 14.5 7.0 - 25.0    Anion Gap 9.6 mmol/L   CBC Auto Differential    Collection Time: 12/18/18 10:32 AM   Result Value Ref Range    WBC 7.93 4.50 - 10.70 10*3/mm3    RBC 4.65 4.60 - 6.00 10*6/mm3    Hemoglobin 14.0 13.7 - 17.6 g/dL    Hematocrit 40.8 40.4 - 52.2 %    MCV 87.7 79.8 - 96.2 fL    MCH 30.1 27.0 - 32.7 pg    MCHC 34.3 32.6 - 36.4 g/dL    RDW 13.5 11.5 - 14.5 %    RDW-SD 43.5 37.0 - 54.0 fl    MPV 10.6 6.0 - 12.0 fL    Platelets 173 140 - 500 10*3/mm3    Neutrophil % 80.6 (H) 42.7 - 76.0 %    Lymphocyte % 14.4 (L) 19.6 - 45.3 %    Monocyte % 3.9 (L) 5.0 - 12.0 %    Eosinophil % 1.0 0.3 - 6.2 %    Basophil % 0.1 0.0 - 1.5 %    Immature Grans % 0.4 0.0 - 0.5 %    Neutrophils, Absolute 6.39 1.90 - 8.10 10*3/mm3    Lymphocytes, Absolute 1.14 0.90 - 4.80 10*3/mm3    Monocytes, Absolute 0.31 0.20 - 1.20 10*3/mm3    Eosinophils, Absolute 0.08  0.00 - 0.70 10*3/mm3    Basophils, Absolute 0.01 0.00 - 0.20 10*3/mm3    Immature Grans, Absolute 0.03 0.00 - 0.03 10*3/mm3   Green Top (Gel)    Collection Time: 12/18/18 10:32 AM   Result Value Ref Range    Extra Tube Hold for add-ons.    Lavender Top    Collection Time: 12/18/18 10:32 AM   Result Value Ref Range    Extra Tube hold for add-on    Gold Top - SST    Collection Time: 12/18/18 10:32 AM   Result Value Ref Range    Extra Tube Hold for add-ons.    Light Blue Top    Collection Time: 12/18/18 10:33 AM   Result Value Ref Range    Extra Tube hold for add-on        I ordered the above labs and reviewed the results.        RADIOLOGY  CT Abdomen Pelvis Without Contrast (Preliminary result)   Result time 12/18/18 12:22:35   Preliminary result by Linus Rodriguez MD (12/18/18 12:22:35)                Impression:    1.  There is a large heterogeneous mass measuring up to 7.4 cm arising  anteriorly at the upper and midpole of the left kidney. There is an  additional smaller 2.7 cm lesion at the lower pole with similar  characteristics. Both of these are concerning for renal cell carcinoma.  There is mild distention of the left renal vein at the hilum and a small  thrombus cannot be excluded. The more proximal left renal vein and IVC  are normal.  There is extensive periureteric and perinephric stranding  present. Dependent hyperdensity within the renal pelvis and ureter  likely represents hemorrhage.  2. Colonic diverticulosis.     These findings were discussed with Dr. Hendricks by telephone.     Radiation dose reduction techniques were utilized, including automated  exposure control and exposure modulation based on body size.                  Narrative:    CT ABDOMEN AND PELVIS WITHOUT CONTRAST     HISTORY: Hematuria and left lower pelvic pain.     TECHNIQUE: Axial CT images of the abdomen and pelvis were obtained  without administration of intravenous contrast. The patient was not  given oral contrast. Coronal and  sagittal reformats were obtained.     COMPARISON: None.      FINDINGS: There is a heterogeneous mass seen within the anterior cortex  of the upper and midpole of the left kidney. Approximate dimensions of  this mass are 7.4 x 7.1 cm. There is layering hyperdensity seen within  the left renal pelvis and ureter consistent with hemorrhage. There is  extensive perinephric and periureteric stranding present. There is also  dilatation of the proximal left renal vein near the renal hilum. There  is a second lesion seen at the inferior pole of the left kidney  measuring 2.7 x 2.4 cm with internal attenuation values of approximately  36 Hounsfield units.     Bilateral adrenal glands and the right kidney is normal. The urinary  bladder is minimally distended.     Noncontrast attenuation of the liver is normal. The gallbladder, spleen  and the pancreas is normal. The small and large bowel loops demonstrate  normal caliber. Colonic diverticulosis is present. No pathological  retroperitoneal lymphadenopathy. Degenerative disc disease is seen in  the spine. There is 5-6 mm anterolisthesis of L5 on S1 with bilateral  pars defects at this level.                         I ordered the above noted radiological studies and reviewed the images on the PACS system.          PROGRESS AND CONSULTS  1023- Ordered IV fluids for hydration, toradol and dilaudid for pain, and zofran for nausea. Ordered CT abd/pel, blood work, and UA for further evaluation.     1030- Transferred care to Dr Hendricks.       COURSE & MEDICAL DECISION MAKING  Pertinent Labs and Imaging studies that were ordered and reviewed are noted above.        MEDICATIONS GIVEN IN ER  Medications   sodium chloride 0.9 % flush 10 mL (not administered)   Sodium chloride 0.9 % infusion (0 mL/hr Intravenous Stopped 12/18/18 1314)   ketorolac (TORADOL) injection 15 mg (15 mg Intravenous Given 12/18/18 1038)   sodium chloride 0.9 % bolus 500 mL (0 mL Intravenous Stopped 12/18/18 1102)  "  HYDROmorphone (DILAUDID) injection 0.5 mg (0.5 mg Intravenous Given 12/18/18 1041)   ondansetron (ZOFRAN) injection 4 mg (4 mg Intravenous Given 12/18/18 1035)   HYDROmorphone (DILAUDID) injection 0.5 mg (0.5 mg Intravenous Given 12/18/18 1253)   ondansetron (ZOFRAN) injection 4 mg (4 mg Intravenous Given 12/18/18 1249)             DISPOSITION VITALS  /87   Pulse 64   Temp 98.6 °F (37 °C) (Oral)   Resp 16   Ht 172.7 cm (68\")   Wt 83.9 kg (185 lb)   SpO2 95%   BMI 28.13 kg/m²         Documentation assistance provided by venkat Duran for LAWRENCE Jaimes.  Information recorded by the scribe was done at my direction and has been verified and validated by me.       Lisa Duran  12/18/18 1229       Lorna Mckeon APRN  12/18/18 1406    "

## 2018-12-18 NOTE — DISCHARGE INSTRUCTIONS
Dr. Call's office should call you later today to re-schedule your appointment.  Return to the ER for any problems.

## 2018-12-19 ENCOUNTER — APPOINTMENT (OUTPATIENT)
Dept: GENERAL RADIOLOGY | Facility: HOSPITAL | Age: 63
End: 2018-12-19

## 2018-12-19 LAB — BACTERIA SPEC AEROBE CULT: NO GROWTH

## 2018-12-20 ENCOUNTER — TELEPHONE (OUTPATIENT)
Dept: NEUROSURGERY | Facility: CLINIC | Age: 63
End: 2018-12-20

## 2018-12-20 NOTE — TELEPHONE ENCOUNTER
Pt called to let you know that he was cancelling his follow up appt here to discuss surgery with Dr FRANZ, he has just been diagnosed with renal cancer and that takes precedence that this time.    Will call back to reschedule

## 2018-12-21 ENCOUNTER — APPOINTMENT (OUTPATIENT)
Dept: ULTRASOUND IMAGING | Facility: HOSPITAL | Age: 63
End: 2018-12-21

## 2018-12-21 ENCOUNTER — APPOINTMENT (OUTPATIENT)
Dept: CT IMAGING | Facility: HOSPITAL | Age: 63
End: 2018-12-21

## 2018-12-21 ENCOUNTER — APPOINTMENT (OUTPATIENT)
Dept: GENERAL RADIOLOGY | Facility: HOSPITAL | Age: 63
End: 2018-12-21

## 2020-11-03 VITALS
DIASTOLIC BLOOD PRESSURE: 88 MMHG | HEART RATE: 57 BPM | SYSTOLIC BLOOD PRESSURE: 142 MMHG | HEART RATE: 62 BPM | SYSTOLIC BLOOD PRESSURE: 126 MMHG | SYSTOLIC BLOOD PRESSURE: 130 MMHG | DIASTOLIC BLOOD PRESSURE: 76 MMHG | OXYGEN SATURATION: 97 % | DIASTOLIC BLOOD PRESSURE: 83 MMHG | HEIGHT: 68 IN | RESPIRATION RATE: 12 BRPM | DIASTOLIC BLOOD PRESSURE: 79 MMHG | HEART RATE: 59 BPM | RESPIRATION RATE: 18 BRPM | OXYGEN SATURATION: 92 % | HEART RATE: 63 BPM | RESPIRATION RATE: 8 BRPM | OXYGEN SATURATION: 96 % | HEART RATE: 64 BPM | SYSTOLIC BLOOD PRESSURE: 146 MMHG | HEART RATE: 61 BPM | RESPIRATION RATE: 11 BRPM | DIASTOLIC BLOOD PRESSURE: 68 MMHG | RESPIRATION RATE: 14 BRPM | TEMPERATURE: 97.3 F | SYSTOLIC BLOOD PRESSURE: 107 MMHG | HEART RATE: 66 BPM | TEMPERATURE: 97.2 F | SYSTOLIC BLOOD PRESSURE: 162 MMHG | OXYGEN SATURATION: 98 % | SYSTOLIC BLOOD PRESSURE: 117 MMHG | WEIGHT: 190 LBS | DIASTOLIC BLOOD PRESSURE: 74 MMHG | DIASTOLIC BLOOD PRESSURE: 69 MMHG | RESPIRATION RATE: 16 BRPM | SYSTOLIC BLOOD PRESSURE: 118 MMHG

## 2020-11-06 ENCOUNTER — AMBULATORY SURGICAL CENTER (OUTPATIENT)
Dept: URBAN - METROPOLITAN AREA SURGERY 17 | Facility: SURGERY | Age: 65
End: 2020-11-06
Payer: COMMERCIAL

## 2020-11-06 ENCOUNTER — OFFICE (OUTPATIENT)
Dept: URBAN - METROPOLITAN AREA PATHOLOGY 4 | Facility: PATHOLOGY | Age: 65
End: 2020-11-06
Payer: MEDICARE

## 2020-11-06 DIAGNOSIS — K29.50 UNSPECIFIED CHRONIC GASTRITIS WITHOUT BLEEDING: ICD-10-CM

## 2020-11-06 DIAGNOSIS — K22.70 BARRETT'S ESOPHAGUS WITHOUT DYSPLASIA: ICD-10-CM

## 2020-11-06 DIAGNOSIS — K31.7 POLYP OF STOMACH AND DUODENUM: ICD-10-CM

## 2020-11-06 PROBLEM — K31.89 OTHER DISEASES OF STOMACH AND DUODENUM: Status: ACTIVE | Noted: 2020-11-06

## 2020-11-06 LAB
GI HISTOLOGY: A. SELECT: (no result)
GI HISTOLOGY: B. UNSPECIFIED: (no result)
GI HISTOLOGY: C. UNSPECIFIED: (no result)
GI HISTOLOGY: D. UNSPECIFIED: (no result)
GI HISTOLOGY: PDF REPORT: (no result)

## 2020-11-06 PROCEDURE — 43239 EGD BIOPSY SINGLE/MULTIPLE: CPT | Performed by: INTERNAL MEDICINE

## 2020-11-06 PROCEDURE — 88305 TISSUE EXAM BY PATHOLOGIST: CPT | Mod: TC,26 | Performed by: INTERNAL MEDICINE

## 2020-11-06 PROCEDURE — 88305 TISSUE EXAM BY PATHOLOGIST: CPT | Performed by: INTERNAL MEDICINE

## 2020-11-06 PROCEDURE — 88305 TISSUE EXAM BY PATHOLOGIST: CPT | Mod: 26,TC | Performed by: INTERNAL MEDICINE

## 2020-11-06 RX ORDER — OMEPRAZOLE 20 MG/1
CAPSULE, DELAYED RELEASE ORAL
Qty: 90 | Refills: 3 | Status: ACTIVE

## 2020-11-06 NOTE — SERVICEHPINOTES
HENRY POMPA  is a  65  male   who presents today for a  EGD   for   the indications listed below. The updated Patient Profile was reviewed prior to the procedure, in conjunction with the Physical Exam, including medical conditions, surgical procedures, medications, allergies, family history and social history. See Physical Exam time stamp below for date and time of HPI completion.Pre-operatively, I reviewed the indication(s) for the procedure, the risks of the procedure [including but not limited to: unexpected bleeding possibly requiring hospitalization and/or unplanned repeat procedures, perforation possibly requiring surgical treatment, missed lesions and complications of sedation/MAC (also explained by anesthesia staff)]. I have evaluated the patient for risks associated with the planned anesthesia and the procedure to be performed and find the patient an acceptable candidate for IV sedation.Multiple opportunities were provided for any questions or concerns, and all questions were answered satisfactorily before any anesthesia was administered. We will proceed with the planned procedure.BR

## 2021-03-22 ENCOUNTER — BULK ORDERING (OUTPATIENT)
Dept: CASE MANAGEMENT | Facility: OTHER | Age: 66
End: 2021-03-22

## 2021-03-22 DIAGNOSIS — Z23 IMMUNIZATION DUE: ICD-10-CM

## 2021-06-22 ENCOUNTER — OFFICE (OUTPATIENT)
Dept: URBAN - METROPOLITAN AREA CLINIC 75 | Facility: CLINIC | Age: 66
End: 2021-06-22

## 2021-06-22 VITALS
OXYGEN SATURATION: 98 % | DIASTOLIC BLOOD PRESSURE: 70 MMHG | HEIGHT: 68 IN | WEIGHT: 193 LBS | SYSTOLIC BLOOD PRESSURE: 138 MMHG | HEART RATE: 60 BPM

## 2021-06-22 DIAGNOSIS — K31.7 POLYP OF STOMACH AND DUODENUM: ICD-10-CM

## 2021-06-22 DIAGNOSIS — K22.70 BARRETT'S ESOPHAGUS WITHOUT DYSPLASIA: ICD-10-CM

## 2021-06-22 PROCEDURE — 99214 OFFICE O/P EST MOD 30 MIN: CPT | Performed by: INTERNAL MEDICINE

## 2021-07-26 VITALS
TEMPERATURE: 98.2 F | DIASTOLIC BLOOD PRESSURE: 86 MMHG | HEART RATE: 67 BPM | RESPIRATION RATE: 12 BRPM | DIASTOLIC BLOOD PRESSURE: 76 MMHG | RESPIRATION RATE: 20 BRPM | WEIGHT: 193 LBS | SYSTOLIC BLOOD PRESSURE: 112 MMHG | DIASTOLIC BLOOD PRESSURE: 73 MMHG | RESPIRATION RATE: 14 BRPM | SYSTOLIC BLOOD PRESSURE: 113 MMHG | HEART RATE: 65 BPM | HEART RATE: 71 BPM | OXYGEN SATURATION: 92 % | HEART RATE: 58 BPM | OXYGEN SATURATION: 88 % | SYSTOLIC BLOOD PRESSURE: 104 MMHG | OXYGEN SATURATION: 95 % | HEART RATE: 61 BPM | TEMPERATURE: 97.8 F | HEIGHT: 68 IN | HEART RATE: 62 BPM | OXYGEN SATURATION: 97 % | DIASTOLIC BLOOD PRESSURE: 72 MMHG | SYSTOLIC BLOOD PRESSURE: 103 MMHG | DIASTOLIC BLOOD PRESSURE: 74 MMHG | OXYGEN SATURATION: 96 % | SYSTOLIC BLOOD PRESSURE: 132 MMHG | SYSTOLIC BLOOD PRESSURE: 136 MMHG | RESPIRATION RATE: 18 BRPM

## 2021-07-28 ENCOUNTER — AMBULATORY SURGICAL CENTER (OUTPATIENT)
Dept: URBAN - METROPOLITAN AREA SURGERY 17 | Facility: SURGERY | Age: 66
End: 2021-07-28
Payer: COMMERCIAL

## 2021-07-28 ENCOUNTER — OFFICE (OUTPATIENT)
Dept: URBAN - METROPOLITAN AREA PATHOLOGY 4 | Facility: PATHOLOGY | Age: 66
End: 2021-07-28
Payer: COMMERCIAL

## 2021-07-28 DIAGNOSIS — K31.7 POLYP OF STOMACH AND DUODENUM: ICD-10-CM

## 2021-07-28 DIAGNOSIS — K22.70 BARRETT'S ESOPHAGUS WITHOUT DYSPLASIA: ICD-10-CM

## 2021-07-28 LAB
GI HISTOLOGY: A. UNSPECIFIED: (no result)
GI HISTOLOGY: B. UNSPECIFIED: (no result)
GI HISTOLOGY: PDF REPORT: (no result)

## 2021-07-28 PROCEDURE — 43239 EGD BIOPSY SINGLE/MULTIPLE: CPT | Performed by: INTERNAL MEDICINE

## 2021-07-28 PROCEDURE — 88305 TISSUE EXAM BY PATHOLOGIST: CPT | Mod: Q6 | Performed by: INTERNAL MEDICINE

## 2022-03-09 ENCOUNTER — OFFICE VISIT (OUTPATIENT)
Dept: CARDIOLOGY | Facility: CLINIC | Age: 67
End: 2022-03-09

## 2022-03-09 VITALS
WEIGHT: 195.4 LBS | HEIGHT: 68 IN | SYSTOLIC BLOOD PRESSURE: 120 MMHG | BODY MASS INDEX: 29.61 KG/M2 | HEART RATE: 65 BPM | DIASTOLIC BLOOD PRESSURE: 80 MMHG

## 2022-03-09 DIAGNOSIS — R06.09 EXERTIONAL DYSPNEA: ICD-10-CM

## 2022-03-09 DIAGNOSIS — I25.10 CORONARY ARTERY CALCIFICATION: Primary | ICD-10-CM

## 2022-03-09 DIAGNOSIS — I25.84 CORONARY ARTERY CALCIFICATION: Primary | ICD-10-CM

## 2022-03-09 PROCEDURE — 99204 OFFICE O/P NEW MOD 45 MIN: CPT | Performed by: INTERNAL MEDICINE

## 2022-03-09 PROCEDURE — 93000 ELECTROCARDIOGRAM COMPLETE: CPT | Performed by: INTERNAL MEDICINE

## 2022-03-09 RX ORDER — ASPIRIN 81 MG/1
81 TABLET ORAL DAILY
Qty: 90 TABLET | Refills: 3 | Status: SHIPPED | OUTPATIENT
Start: 2022-03-09 | End: 2023-03-27

## 2022-03-09 RX ORDER — HYDROCHLOROTHIAZIDE 12.5 MG/1
12.5 CAPSULE, GELATIN COATED ORAL DAILY
COMMUNITY
Start: 2022-02-23

## 2022-03-09 RX ORDER — TADALAFIL 5 MG/1
5 TABLET ORAL DAILY PRN
COMMUNITY

## 2022-03-09 RX ORDER — ATORVASTATIN CALCIUM 20 MG/1
20 TABLET, FILM COATED ORAL DAILY
COMMUNITY
Start: 2022-01-13

## 2022-03-09 RX ORDER — METOPROLOL SUCCINATE 50 MG/1
TABLET, EXTENDED RELEASE ORAL DAILY
COMMUNITY
Start: 2022-03-03

## 2022-03-09 NOTE — PROGRESS NOTES
[facility]      Patient Name: Mino Aguila III  :1955  Age: 66 y.o.  Encounter Provider:  Jorje Brady Jr, MD      Chief Complaint: Initial evaluation for management of exercise related hypotension and coronary artery calcification      HPI  Mino Aguila III is a 66 y.o. male with no previous cardiac history who presents for initial evaluation of the a forementioned issues.  Patient was diagnosed with renal cell carcinoma in 2018.  In planning for nephrectomy a nuclear stress study was performed with no evidence of ischemia.  He had uncomplicated surgery and is recovered well with no imaging surveillance of recurrent cancer.  Recent CT chest showed stable lung nodules, minimal thoracic aortic dilatation and moderate to severe coronary calcifications.  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  One time he checked his blood pressure and it was down to 90s over 60s.  He notes mild dizziness on standing.  He is currently on hydrochlorothiazide.  He does have some mild dyspnea on exertion but no angina.  No orthopnea, PND or edema.  No palpitations, dizziness or syncope.  Former smoker quit 40 years ago, denies alcohol or illicit drug use.  No family history of coronary artery disease or sudden cardiac death but mother was diagnosed with carotid stenosis and peripheral arterial disease in her 70s.  She was a longtime smoker.      The following portions of the patient's history were reviewed and updated as appropriate: allergies, current medications, past family history, past medical history, past social history, past surgical history and problem list.      Review of Systems   Constitutional: Positive for malaise/fatigue. Negative for chills and fever.   HENT: Negative for hoarse voice and sore throat.    Eyes: Negative for double vision and photophobia.   Cardiovascular: Negative for chest pain, leg swelling, near-syncope,  "orthopnea, palpitations, paroxysmal nocturnal dyspnea and syncope.   Respiratory: Negative for cough and wheezing.    Skin: Negative for poor wound healing and rash.   Musculoskeletal: Negative for arthritis and joint swelling.   Gastrointestinal: Negative for bloating, abdominal pain, hematemesis and hematochezia.   Neurological: Positive for dizziness and weakness. Negative for focal weakness.   Psychiatric/Behavioral: Negative for depression and suicidal ideas.       OBJECTIVE:   Vital Signs  Vitals:    03/09/22 1041   BP: 120/80   Pulse: 65     Estimated body mass index is 29.71 kg/m² as calculated from the following:    Height as of this encounter: 172.7 cm (68\").    Weight as of this encounter: 88.6 kg (195 lb 6.4 oz).    Vitals reviewed.   Cardiovascular:      PMI at left midclavicular line. Normal rate. Regular rhythm. Normal S1. Normal S2.      Murmurs: There is a systolic murmur at the LRSB.      No gallop. No click. No rub.   Pulses:     Intact distal pulses.   Edema:     Peripheral edema absent.           ECG 12 Lead    Date/Time: 3/9/2022 11:09 AM  Performed by: Jorje Brady Jr., MD  Authorized by: Jorje Brady Jr., MD   Comparison: not compared with previous ECG   Previous ECG: no previous ECG available  Rhythm: sinus rhythm  Conduction: 1st degree AV block    Clinical impression: abnormal EKG                  ASSESSMENT:     Postural dizziness  Coronary artery calcification  Exercise related drop in blood pressure  Renal cell carcinoma  Hypertension    PLAN OF CARE:     1. Exercise related drop in blood pressure -etiology uncertain.  Patient does have orthostasis complaints and orthostatic blood pressure measurements will be performed today in clinic.  This may be related to hydrochlorothiazide use as the patient's blood pressure log at home does show low normal blood pressure.  This may also be an abnormal response to exercise related to myocardial ischemia.  Patient has a murmur on exam.  " Abnormal EKG.  We will plan for stress echocardiogram.  2. Coronary artery calcification -continue statin and add aspirin.  Stress study as above.  3. Postural dizziness -orthostatics today.  May need to consider stopping hydrochlorothiazide.  4. Hypertension -systolic blood pressure averages 110 mmHg.  Continue sodium restricted diet.  Twice daily blood pressure log.  5. Renal cell carcinoma -followed by urology.  Recent surveillance with no evidence for recurrence.    Return to clinic 6 months    Addendum: Patient was not orthostatic on measurements.  Continue hydrochlorothiazide for now.       Discharge Medications          Accurate as of March 9, 2022 10:44 AM. If you have any questions, ask your nurse or doctor.            Continue These Medications      Instructions Start Date   allopurinol 300 MG tablet  Commonly known as: ZYLOPRIM   300 mg, Oral, Daily      amoxicillin-clavulanate 875-125 MG per tablet  Commonly known as: AUGMENTIN   1 tablet, Oral, 2 Times Daily      atorvastatin 20 MG tablet  Commonly known as: LIPITOR   20 mg, Oral, Daily      ciprofloxacin 0.3 % ophthalmic solution  Commonly known as: CILOXAN   2 drops, Left Eye, Every 3 Hours, For 7 days      doxazosin 8 MG tablet  Commonly known as: CARDURA   8 mg, Oral, Nightly      hydroCHLOROthiazide 12.5 MG capsule  Commonly known as: MICROZIDE   12.5 mg, Oral, Daily      metoprolol succinate XL 50 MG 24 hr tablet  Commonly known as: TOPROL-XL   Daily      metoprolol tartrate 100 MG tablet  Commonly known as: LOPRESSOR   100 mg, Oral, 2 Times Daily      olmesartan 40 MG tablet  Commonly known as: BENICAR   No dose, route, or frequency recorded.      omeprazole 20 MG capsule  Commonly known as: priLOSEC   20 mg, Oral, Daily      predniSONE 20 MG tablet  Commonly known as: DELTASONE   40 mg, Oral, Daily      rosuvastatin 10 MG tablet  Commonly known as: CRESTOR   No dose, route, or frequency recorded.      tadalafil 5 MG tablet  Commonly known as:  CIALIS   5 mg, Oral, Daily PRN      Vitamin D 50 MCG (2000 UT) capsule   1,000 Units, Oral, Daily             Thank you for allowing me to participate in the care of your patient,      Sincerely,   Jorje Brady MD  [facility]  03/09/22  10:44 EST

## 2022-03-16 ENCOUNTER — HOSPITAL ENCOUNTER (OUTPATIENT)
Dept: CARDIOLOGY | Facility: HOSPITAL | Age: 67
Discharge: HOME | End: 2022-03-16
Admitting: INTERNAL MEDICINE

## 2022-03-16 VITALS
OXYGEN SATURATION: 98 % | WEIGHT: 195 LBS | DIASTOLIC BLOOD PRESSURE: 80 MMHG | HEART RATE: 72 BPM | BODY MASS INDEX: 29.55 KG/M2 | HEIGHT: 68 IN | SYSTOLIC BLOOD PRESSURE: 138 MMHG

## 2022-03-16 DIAGNOSIS — R06.09 EXERTIONAL DYSPNEA: ICD-10-CM

## 2022-03-16 LAB
AORTIC ARCH: 2.3 CM
ASCENDING AORTA: 3.5 CM
BH CV ECHO MEAS - ACS: 2.3 CM
BH CV ECHO MEAS - AO MAX PG: 5.2 MMHG
BH CV ECHO MEAS - AO MEAN PG: 2.8 MMHG
BH CV ECHO MEAS - AO ROOT DIAM: 3.9 CM
BH CV ECHO MEAS - AO V2 MAX: 114 CM/SEC
BH CV ECHO MEAS - AO V2 VTI: 25.8 CM
BH CV ECHO MEAS - AVA(I,D): 2.01 CM2
BH CV ECHO MEAS - EDV(CUBED): 74.9 ML
BH CV ECHO MEAS - EDV(MOD-SP4): 73 ML
BH CV ECHO MEAS - EF(MOD-BP): 64 %
BH CV ECHO MEAS - EF(MOD-SP4): 74 %
BH CV ECHO MEAS - ESV(CUBED): 18 ML
BH CV ECHO MEAS - ESV(MOD-SP4): 19 ML
BH CV ECHO MEAS - FS: 37.8 %
BH CV ECHO MEAS - IVS/LVPW: 0.9 CM
BH CV ECHO MEAS - IVSD: 0.87 CM
BH CV ECHO MEAS - LAT PEAK E' VEL: 8.5 CM/SEC
BH CV ECHO MEAS - LV DIASTOLIC VOL/BSA (35-75): 36.1 CM2
BH CV ECHO MEAS - LV MASS(C)D: 122.7 GRAMS
BH CV ECHO MEAS - LV MAX PG: 2.8 MMHG
BH CV ECHO MEAS - LV MEAN PG: 1.75 MMHG
BH CV ECHO MEAS - LV SYSTOLIC VOL/BSA (12-30): 9.4 CM2
BH CV ECHO MEAS - LV V1 MAX: 84.4 CM/SEC
BH CV ECHO MEAS - LV V1 VTI: 14.9 CM
BH CV ECHO MEAS - LVIDD: 4.2 CM
BH CV ECHO MEAS - LVIDS: 2.6 CM
BH CV ECHO MEAS - LVOT AREA: 3.5 CM2
BH CV ECHO MEAS - LVOT DIAM: 2.1 CM
BH CV ECHO MEAS - LVPWD: 0.97 CM
BH CV ECHO MEAS - MED PEAK E' VEL: 6.5 CM/SEC
BH CV ECHO MEAS - MV A DUR: 0.11 SEC
BH CV ECHO MEAS - MV A MAX VEL: 91.7 CM/SEC
BH CV ECHO MEAS - MV DEC SLOPE: 431.3 CM/SEC2
BH CV ECHO MEAS - MV DEC TIME: 0.21 MSEC
BH CV ECHO MEAS - MV E MAX VEL: 74.6 CM/SEC
BH CV ECHO MEAS - MV E/A: 0.81
BH CV ECHO MEAS - MV MAX PG: 3.4 MMHG
BH CV ECHO MEAS - MV MEAN PG: 1.64 MMHG
BH CV ECHO MEAS - MV V2 VTI: 30.5 CM
BH CV ECHO MEAS - MVA(VTI): 1.7 CM2
BH CV ECHO MEAS - PA ACC TIME: 0.12 SEC
BH CV ECHO MEAS - PA PR(ACCEL): 26.7 MMHG
BH CV ECHO MEAS - PA V2 MAX: 95 CM/SEC
BH CV ECHO MEAS - PI END-D VEL: 101.8 CM/SEC
BH CV ECHO MEAS - PULM A REVS DUR: 0.1 SEC
BH CV ECHO MEAS - PULM A REVS VEL: 34.3 CM/SEC
BH CV ECHO MEAS - PULM DIAS VEL: 34.3 CM/SEC
BH CV ECHO MEAS - PULM SYS VEL: 51.8 CM/SEC
BH CV ECHO MEAS - RV MAX PG: 2.43 MMHG
BH CV ECHO MEAS - RV V1 MAX: 78 CM/SEC
BH CV ECHO MEAS - RV V1 VTI: 14 CM
BH CV ECHO MEAS - RVOT DIAM: 2.12 CM
BH CV ECHO MEAS - SI(MOD-SP4): 26.7 ML/M2
BH CV ECHO MEAS - SUP REN AO DIAM: 2.1 CM
BH CV ECHO MEAS - SV(LVOT): 51.8 ML
BH CV ECHO MEAS - SV(MOD-SP4): 54 ML
BH CV ECHO MEAS - SV(RVOT): 49.7 ML
BH CV ECHO MEAS - TAPSE (>1.6): 2.4 CM
BH CV ECHO MEASUREMENTS AVERAGE E/E' RATIO: 9.95
BH CV STRESS BP STAGE 1: NORMAL
BH CV STRESS DURATION MIN STAGE 1: 3
BH CV STRESS DURATION MIN STAGE 2: 1
BH CV STRESS DURATION SEC STAGE 1: 0
BH CV STRESS DURATION SEC STAGE 2: 30
BH CV STRESS ECHO POST STRESS EJECTION FRACTION EF: 74 %
BH CV STRESS GRADE STAGE 1: 10
BH CV STRESS GRADE STAGE 2: 12
BH CV STRESS HR STAGE 1: 128
BH CV STRESS HR STAGE 2: 140
BH CV STRESS METS STAGE 1: 5
BH CV STRESS METS STAGE 2: 7.5
BH CV STRESS O2 STAGE 1: 95
BH CV STRESS O2 STAGE 2: 94
BH CV STRESS PROTOCOL 1: NORMAL
BH CV STRESS SPEED STAGE 1: 1.7
BH CV STRESS SPEED STAGE 2: 2.5
BH CV STRESS STAGE 1: 1
BH CV STRESS STAGE 2: 2
BH CV XLRA - RV BASE: 4.1 CM
BH CV XLRA - RV LENGTH: 7.7 CM
BH CV XLRA - RV MID: 3.5 CM
BH CV XLRA - TDI S': 14.9 CM/SEC
LEFT ATRIUM VOLUME INDEX: 18.3 ML/M2
MAXIMAL PREDICTED HEART RATE: 152 BPM
PERCENT MAX PREDICTED HR: 92.11 %
SINUS: 4 CM
STJ: 3.1 CM
STRESS BASELINE BP: NORMAL MMHG
STRESS BASELINE HR: 72 BPM
STRESS O2 SAT REST: 98 %
STRESS PERCENT HR: 108 %
STRESS POST ESTIMATED WORKLOAD: 6 METS
STRESS POST EXERCISE DUR MIN: 4 MIN
STRESS POST EXERCISE DUR SEC: 30 SEC
STRESS POST O2 SAT PEAK: 94 %
STRESS POST PEAK BP: NORMAL MMHG
STRESS POST PEAK HR: 140 BPM
STRESS TARGET HR: 129 BPM

## 2022-03-16 PROCEDURE — 93320 DOPPLER ECHO COMPLETE: CPT | Performed by: INTERNAL MEDICINE

## 2022-03-16 PROCEDURE — 93350 STRESS TTE ONLY: CPT

## 2022-03-16 PROCEDURE — 93325 DOPPLER ECHO COLOR FLOW MAPG: CPT | Performed by: INTERNAL MEDICINE

## 2022-03-16 PROCEDURE — 25010000002 PERFLUTREN (DEFINITY) 8.476 MG IN SODIUM CHLORIDE (PF) 0.9 % 10 ML INJECTION: Performed by: INTERNAL MEDICINE

## 2022-03-16 PROCEDURE — 93320 DOPPLER ECHO COMPLETE: CPT

## 2022-03-16 PROCEDURE — 93350 STRESS TTE ONLY: CPT | Performed by: INTERNAL MEDICINE

## 2022-03-16 PROCEDURE — 93017 CV STRESS TEST TRACING ONLY: CPT

## 2022-03-16 PROCEDURE — 93325 DOPPLER ECHO COLOR FLOW MAPG: CPT

## 2022-03-16 PROCEDURE — 93018 CV STRESS TEST I&R ONLY: CPT | Performed by: INTERNAL MEDICINE

## 2022-03-16 PROCEDURE — 93352 ADMIN ECG CONTRAST AGENT: CPT | Performed by: INTERNAL MEDICINE

## 2022-03-16 PROCEDURE — 93016 CV STRESS TEST SUPVJ ONLY: CPT | Performed by: INTERNAL MEDICINE

## 2022-03-16 RX ADMIN — PERFLUTREN 3 ML: 6.52 INJECTION, SUSPENSION INTRAVENOUS at 10:05

## 2022-03-16 NOTE — PROGRESS NOTES
Please let patient know that stress echo shows no evidence for low blood flow to the heart.  I will see him at next scheduled follow-up visit.

## 2022-09-09 ENCOUNTER — OFFICE VISIT (OUTPATIENT)
Dept: CARDIOLOGY | Facility: CLINIC | Age: 67
End: 2022-09-09

## 2022-09-09 VITALS
WEIGHT: 191.8 LBS | HEIGHT: 68 IN | SYSTOLIC BLOOD PRESSURE: 120 MMHG | BODY MASS INDEX: 29.07 KG/M2 | DIASTOLIC BLOOD PRESSURE: 70 MMHG | OXYGEN SATURATION: 96 % | HEART RATE: 74 BPM

## 2022-09-09 DIAGNOSIS — I25.84 CORONARY ARTERY CALCIFICATION: Primary | ICD-10-CM

## 2022-09-09 DIAGNOSIS — I25.10 CORONARY ARTERY DISEASE INVOLVING NATIVE CORONARY ARTERY OF NATIVE HEART WITHOUT ANGINA PECTORIS: ICD-10-CM

## 2022-09-09 DIAGNOSIS — I25.10 CORONARY ARTERY CALCIFICATION: Primary | ICD-10-CM

## 2022-09-09 PROCEDURE — 99213 OFFICE O/P EST LOW 20 MIN: CPT | Performed by: INTERNAL MEDICINE

## 2022-09-09 NOTE — PROGRESS NOTES
[facility]      Patient Name: Mino Aguila III  :1955  Age: 67 y.o.  Encounter Provider:  Jorje Brady Jr, MD      Chief Complaint:  evaluation for management of exercise related hypotension and coronary artery calcification      HPI  Mino Aguila III is a 67 y.o. male with no previous cardiac history who presents for follow-up of dizziness and coronary artery calcifications.      Last clinic visit note: Patient was diagnosed with renal cell carcinoma in 2018.  In planning for nephrectomy a nuclear stress study was performed with no evidence of ischemia.  He had uncomplicated surgery and is recovered well with no imaging surveillance of recurrent cancer.  Recent CT chest showed stable lung nodules, minimal thoracic aortic dilatation and moderate to severe coronary calcifications.  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  One time he checked his blood pressure and it was down to 90s over 60s.  He notes mild dizziness on standing.  He is currently on hydrochlorothiazide.  He does have some mild dyspnea on exertion but no angina.  No orthopnea, PND or edema.  No palpitations, dizziness or syncope.  Former smoker quit 40 years ago, denies alcohol or illicit drug use.  No family history of coronary artery disease or sudden cardiac death but mother was diagnosed with carotid stenosis and peripheral arterial disease in her 70s.  She was a longtime smoker.    Stress echocardiogram showed normal baseline features with no evidence of ischemia at peak stress.  Patient did have poor exercise tolerance only able to walk for 4 minutes.  Borderline measurement of aortic root of 4.0 cm.  Since then all symptoms reported at first visit have resolved other than exertional dyspnea.  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  He notes some mild dyspnea on exertion but is able to work through it.  He denies orthopnea, PND or  "edema.  No palpitations, dizziness or syncope.  Social family history was reviewed and is not pertinent to this clinic visit.    The following portions of the patient's history were reviewed and updated as appropriate: allergies, current medications, past family history, past medical history, past social history, past surgical history and problem list.      Review of Systems   Constitutional: Positive for malaise/fatigue. Negative for chills and fever.   HENT: Negative for hoarse voice and sore throat.    Eyes: Negative for double vision and photophobia.   Cardiovascular: Negative for chest pain, leg swelling, near-syncope, orthopnea, palpitations, paroxysmal nocturnal dyspnea and syncope.   Respiratory: Negative for cough and wheezing.    Skin: Negative for poor wound healing and rash.   Musculoskeletal: Negative for arthritis and joint swelling.   Gastrointestinal: Negative for bloating, abdominal pain, hematemesis and hematochezia.   Neurological: Positive for dizziness and weakness. Negative for focal weakness.   Psychiatric/Behavioral: Negative for depression and suicidal ideas.       OBJECTIVE:   Vital Signs  Vitals:    09/09/22 1129   BP: 120/70   Pulse: 74   SpO2: 96%     Estimated body mass index is 29.16 kg/m² as calculated from the following:    Height as of this encounter: 172.7 cm (68\").    Weight as of this encounter: 87 kg (191 lb 12.8 oz).    Vitals reviewed.   Cardiovascular:      PMI at left midclavicular line. Normal rate. Regular rhythm. Normal S1. Normal S2.      Murmurs: There is a systolic murmur at the LRSB.      No gallop. No click. No rub.   Pulses:     Intact distal pulses.   Edema:     Peripheral edema absent.         Procedures          ASSESSMENT:     Postural dizziness  Coronary artery calcification  Exercise related drop in blood pressure  Renal cell carcinoma  Hypertension    PLAN OF CARE:     1. Dizziness -resolved  2. Coronary artery calcification -continue statin and aspirin.  " Stress study negative for ischemia.  Increase activity as tolerated.  3. Hypertension -seemingly well controlled.  Sodium restricted diet.  Twice daily blood pressure log.  4. Renal cell carcinoma -followed by urology.  Recent surveillance with no evidence for recurrence.    Return to clinic 12 months           Discharge Medications          Accurate as of September 9, 2022 11:31 AM. If you have any questions, ask your nurse or doctor.            Continue These Medications      Instructions Start Date   allopurinol 300 MG tablet  Commonly known as: ZYLOPRIM   300 mg, Oral, Daily      amoxicillin-clavulanate 875-125 MG per tablet  Commonly known as: AUGMENTIN   1 tablet, Oral, 2 Times Daily      aspirin 81 MG EC tablet   81 mg, Oral, Daily      atorvastatin 20 MG tablet  Commonly known as: LIPITOR   20 mg, Oral, Daily      ciprofloxacin 0.3 % ophthalmic solution  Commonly known as: CILOXAN   2 drops, Left Eye, Every 3 Hours, For 7 days      doxazosin 8 MG tablet  Commonly known as: CARDURA   8 mg, Oral, Nightly      hydroCHLOROthiazide 12.5 MG capsule  Commonly known as: MICROZIDE   12.5 mg, Oral, Daily      metoprolol succinate XL 50 MG 24 hr tablet  Commonly known as: TOPROL-XL   Daily      metoprolol tartrate 100 MG tablet  Commonly known as: LOPRESSOR   100 mg, Oral, 2 Times Daily      olmesartan 40 MG tablet  Commonly known as: BENICAR   No dose, route, or frequency recorded.      omeprazole 20 MG capsule  Commonly known as: priLOSEC   20 mg, Oral, Daily      predniSONE 20 MG tablet  Commonly known as: DELTASONE   40 mg, Oral, Daily      rosuvastatin 10 MG tablet  Commonly known as: CRESTOR   No dose, route, or frequency recorded.      tadalafil 5 MG tablet  Commonly known as: CIALIS   5 mg, Oral, Daily PRN      Vitamin D 50 MCG (2000 UT) capsule   1,000 Units, Oral, Daily             Thank you for allowing me to participate in the care of your patient,      Sincerely,   Jorje Brady MD  [facility]  09/09/22  11:31 EDT

## 2022-11-01 ENCOUNTER — TRANSCRIBE ORDERS (OUTPATIENT)
Dept: ADMINISTRATIVE | Facility: HOSPITAL | Age: 67
End: 2022-11-01

## 2022-11-01 DIAGNOSIS — Z13.9 VISIT FOR SCREENING: Primary | ICD-10-CM

## 2022-11-08 ENCOUNTER — HOSPITAL ENCOUNTER (OUTPATIENT)
Dept: CARDIOLOGY | Facility: HOSPITAL | Age: 67
Discharge: HOME | End: 2022-11-08
Admitting: INTERNAL MEDICINE

## 2022-11-08 VITALS
DIASTOLIC BLOOD PRESSURE: 78 MMHG | WEIGHT: 189 LBS | HEIGHT: 67 IN | HEART RATE: 63 BPM | SYSTOLIC BLOOD PRESSURE: 132 MMHG | BODY MASS INDEX: 29.66 KG/M2

## 2022-11-08 DIAGNOSIS — Z13.9 VISIT FOR SCREENING: ICD-10-CM

## 2022-11-08 LAB
BH CV ECHO MEAS - DIST AO DIAM: 1.38 CM
BH CV VAS BP LEFT ARM: NORMAL MMHG
BH CV VAS BP RIGHT ARM: NORMAL MMHG
BH CV XLRA MEAS - MID AO DIAM: 1.54 CM
BH CV XLRA MEAS - PAD LEFT ABI DP: 1.18
BH CV XLRA MEAS - PAD LEFT ABI PT: 1.26
BH CV XLRA MEAS - PAD LEFT ARM: 132 MMHG
BH CV XLRA MEAS - PAD LEFT LEG DP: 156 MMHG
BH CV XLRA MEAS - PAD LEFT LEG PT: 166 MMHG
BH CV XLRA MEAS - PAD RIGHT ABI DP: 1.22
BH CV XLRA MEAS - PAD RIGHT ABI PT: 1.22
BH CV XLRA MEAS - PAD RIGHT ARM: 124 MMHG
BH CV XLRA MEAS - PAD RIGHT LEG DP: 161 MMHG
BH CV XLRA MEAS - PAD RIGHT LEG PT: 161 MMHG
BH CV XLRA MEAS - PROX AO DIAM: 1.76 CM
BH CV XLRA MEAS LEFT MID CCA PSV: NORMAL CM/SEC
BH CV XLRA MEAS LEFT MID ICA PSV: NORMAL CM/SEC
BH CV XLRA MEAS LEFT PROX ECA PSV: NORMAL CM/SEC
BH CV XLRA MEAS RIGHT MID CCA PSV: NORMAL CM/SEC
BH CV XLRA MEAS RIGHT MID ICA PSV: NORMAL CM/SEC
BH CV XLRA MEAS RIGHT PROX ECA PSV: NORMAL CM/SEC
MAXIMAL PREDICTED HEART RATE: 151 BPM
STRESS TARGET HR: 128 BPM

## 2022-11-08 PROCEDURE — 93799 UNLISTED CV SVC/PROCEDURE: CPT

## 2023-03-27 RX ORDER — ASPIRIN 81 MG/1
TABLET, COATED ORAL
Qty: 90 TABLET | Refills: 3 | Status: SHIPPED | OUTPATIENT
Start: 2023-03-27

## 2023-09-11 ENCOUNTER — OFFICE VISIT (OUTPATIENT)
Dept: CARDIOLOGY | Facility: CLINIC | Age: 68
End: 2023-09-11
Payer: MEDICARE

## 2023-09-11 VITALS
WEIGHT: 185.25 LBS | HEIGHT: 67 IN | DIASTOLIC BLOOD PRESSURE: 78 MMHG | BODY MASS INDEX: 29.08 KG/M2 | OXYGEN SATURATION: 96 % | HEART RATE: 64 BPM | SYSTOLIC BLOOD PRESSURE: 123 MMHG

## 2023-09-11 DIAGNOSIS — R00.2 PALPITATIONS: ICD-10-CM

## 2023-09-11 DIAGNOSIS — I77.810 ASCENDING AORTA DILATATION: ICD-10-CM

## 2023-09-11 DIAGNOSIS — I25.10 CORONARY ARTERY DISEASE INVOLVING NATIVE CORONARY ARTERY OF NATIVE HEART WITHOUT ANGINA PECTORIS: Primary | ICD-10-CM

## 2023-09-11 PROCEDURE — 99214 OFFICE O/P EST MOD 30 MIN: CPT | Performed by: NURSE PRACTITIONER

## 2023-09-11 PROCEDURE — 1159F MED LIST DOCD IN RCRD: CPT | Performed by: NURSE PRACTITIONER

## 2023-09-11 PROCEDURE — 1160F RVW MEDS BY RX/DR IN RCRD: CPT | Performed by: NURSE PRACTITIONER

## 2023-09-11 PROCEDURE — 93000 ELECTROCARDIOGRAM COMPLETE: CPT | Performed by: NURSE PRACTITIONER

## 2023-09-11 RX ORDER — CYCLOBENZAPRINE HCL 10 MG
1 TABLET ORAL 3 TIMES DAILY
COMMUNITY
Start: 2023-06-22

## 2023-09-11 RX ORDER — ACETAMINOPHEN 325 MG/1
1 TABLET ORAL
COMMUNITY

## 2023-09-11 NOTE — PROGRESS NOTES
"Date of Office Visit: 23  Encounter Provider: LAWRENCE Elizabeth  Place of Service: [facility]  Patient Name: Mino Aguila III  :1955    Chief Complaint   Patient presents with    Palpitations    Dizziness    Shortness of Breath    Fatigue    Follow-up   :     HPI: Mino Aguila III is a 68 y.o. male  with coronary artery calcification noted on CT, dilated ascending aorta, chronic kidney disease, PMR, obstructive sleep apnea, carotid plaque bilateral.. first degree Av block.        He is followed by Dr. Jorje Brady.  I will visit with him for the first time and have reviewed his medical record.    He had a nonischemic perfusion stress test 2018.  He was found to have coronary artery calcification on CT of the chest.  He then had a stress echo which showed no evidence of ischemia in 2022.  There was no significant valve disease and mild dilation of the sinuses of Valsalva was present at 4.0 cm.    He presents today for reassessment.  He has been diagnosed with PMR and has been on a steroid taper to help control symptoms.  He does shortness of breath if he overexerts himself as no chest pain or edema.  He has dizziness when his pulse is high which happened for 5 times this summer.  He is compliant with CPAP.  He is being followed by urology routinely and has serial CTs to follow lung nodule as well.  No Known Allergies        Family and social history reviewed.     ROS  All other systems were reviewed and are negative          Objective:     Vitals:    23 1100   BP: 123/78   BP Location: Left arm   Patient Position: Sitting   Cuff Size: Adult   Pulse: 64   SpO2: 96%   Weight: 84 kg (185 lb 4 oz)   Height: 170.2 cm (67\")     Body mass index is 29.01 kg/m².    PHYSICAL EXAM:  Pulmonary:      Effort: Pulmonary effort is normal.      Breath sounds: Normal breath sounds.   Cardiovascular:      Normal rate. Regular rhythm.         ECG 12 " Lead    Date/Time: 9/11/2023 11:04 AM  Performed by: Alyssa Lu APRN  Authorized by: Alyssa Lu APRN   Comparison: compared with previous ECG   Similar to previous ECG  Rhythm: sinus rhythm  Rate: normal  QRS axis: normal          Current Outpatient Medications   Medication Sig Dispense Refill    acetaminophen (TYLENOL) 325 MG tablet Take 1 tablet by mouth.      allopurinol (ZYLOPRIM) 300 MG tablet Take 1 tablet by mouth Daily.      Aspirin Low Dose 81 MG EC tablet TAKE 1 TABLET BY MOUTH EVERY DAY 90 tablet 3    atorvastatin (LIPITOR) 20 MG tablet Take 1 tablet by mouth Daily.      cyclobenzaprine (FLEXERIL) 10 MG tablet Take 1 tablet by mouth 3 (Three) Times a Day.      doxazosin (CARDURA) 8 MG tablet Take 1 tablet by mouth Every Night.      hydroCHLOROthiazide (MICROZIDE) 12.5 MG capsule Take 1 capsule by mouth Daily.      metoprolol succinate XL (TOPROL-XL) 50 MG 24 hr tablet Daily.      omeprazole (priLOSEC) 20 MG capsule Take 1 capsule by mouth Daily.      tadalafil (CIALIS) 5 MG tablet Take 1 tablet by mouth Daily As Needed for Erectile Dysfunction.       No current facility-administered medications for this visit.     Assessment:       Diagnosis Plan   1. Coronary artery disease involving native coronary artery of native heart without angina pectoris  ECG 12 Lead      2. Ascending aorta dilatation  ECG 12 Lead      3. Palpitations  ECG 12 Lead           Orders Placed This Encounter   Procedures    ECG 12 Lead     This order was created via procedure documentation     Order Specific Question:   Release to patient     Answer:   Routine Release [[id number]]         Plan:   1.  68-year-old gentleman with coronary artery calcification noted on prior CT.  Nonischemic stress echo March 2022.  Continue aspirin and atorvastatin.  2.  Hypertension-blood pressure appears adequately controlled  3.  Hyperlipidemia on atorvastatin  4.  Chronic kidney disease-follows routinely with nephrology  5.  Obstructive sleep  apnea reports compliance with CPAP.  6.  Dilated ascending aorta at the sinus of Valsalva on stress echo March 2022  7.  History of loeft renal carcinoma.  He follows closely with urology and has serial CT imaging.  He had a negative PET study in January of this year through [facility]  8.  Bilateral carotid artery plaque without stenosis on duplex November 2022  9.  History of first-degree AV block-not present on EKG today  10.  Right lung nodule-stable on last imaging  11.  PMR-following with Dr. Casanova with rheumatology and currently on steroid taper  12.  [id number] times this summer.  Typically with working outside.  Discussed need to increase fluids and we discussed options for external mobile telemetry.  Since his episodes are not very frequent we will hold off on the monitor at this time.  We also discussed the prednisone/steroid use may contribute  13. History of PACs in recovery on stress echo 03/2022      Follow-up in 1 year call questions or concerns          It has been a pleasure to participate in this patient's care.      Thank you,  LAWRENCE Elizabeth      **I used Dragon to dictate this note:**

## 2023-12-24 ENCOUNTER — APPOINTMENT (OUTPATIENT)
Dept: CT IMAGING | Facility: HOSPITAL | Age: 68
End: 2023-12-24
Payer: MEDICARE

## 2023-12-24 ENCOUNTER — APPOINTMENT (OUTPATIENT)
Dept: GENERAL RADIOLOGY | Facility: HOSPITAL | Age: 68
End: 2023-12-24
Payer: MEDICARE

## 2023-12-24 ENCOUNTER — HOSPITAL ENCOUNTER (EMERGENCY)
Facility: HOSPITAL | Age: 68
Discharge: HOME | End: 2023-12-24
Attending: EMERGENCY MEDICINE | Admitting: EMERGENCY MEDICINE
Payer: MEDICARE

## 2023-12-24 VITALS
HEIGHT: 68 IN | WEIGHT: 190 LBS | DIASTOLIC BLOOD PRESSURE: 92 MMHG | BODY MASS INDEX: 28.79 KG/M2 | OXYGEN SATURATION: 100 % | HEART RATE: 74 BPM | SYSTOLIC BLOOD PRESSURE: 157 MMHG | TEMPERATURE: 98.9 F | RESPIRATION RATE: 18 BRPM

## 2023-12-24 DIAGNOSIS — S83.91XA SPRAIN OF RIGHT KNEE, UNSPECIFIED LIGAMENT, INITIAL ENCOUNTER: Primary | ICD-10-CM

## 2023-12-24 PROCEDURE — 73562 X-RAY EXAM OF KNEE 3: CPT

## 2023-12-24 PROCEDURE — 99284 EMERGENCY DEPT VISIT MOD MDM: CPT

## 2023-12-24 PROCEDURE — 73700 CT LOWER EXTREMITY W/O DYE: CPT

## 2023-12-24 RX ORDER — HYDROCODONE BITARTRATE AND ACETAMINOPHEN 5; 325 MG/1; MG/1
1 TABLET ORAL ONCE
Status: COMPLETED | OUTPATIENT
Start: 2023-12-24 | End: 2023-12-24

## 2023-12-24 RX ORDER — HYDROCODONE BITARTRATE AND ACETAMINOPHEN 5; 325 MG/1; MG/1
1 TABLET ORAL EVERY 6 HOURS PRN
Qty: 12 TABLET | Refills: 0 | Status: SHIPPED | OUTPATIENT
Start: 2023-12-24 | End: 2023-12-27

## 2023-12-24 RX ADMIN — HYDROCODONE BITARTRATE AND ACETAMINOPHEN 1 TABLET: 5; 325 TABLET ORAL at 08:21

## 2023-12-24 NOTE — ED NOTES
Pt was not able to apply full weight on the R leg even with the use of the walker. PA notified. Ct scan ordered.

## 2023-12-24 NOTE — ED NOTES
Pt stated he would follow up with ortho after the holiday. Ace band applied. Pt has a hx of knee replacements and stated he is very fimilar with applying ACE wrap and use of crutches.   Pt has crutches at home and stated he is willing to use those and does not need a pair today.

## 2023-12-24 NOTE — ED PROVIDER NOTES
EMERGENCY DEPARTMENT ENCOUNTER      PCP: Divine Bruno MD  Patient Care Team:  Divine Bruno MD as PCP - General  Divine Bruno MD as PCP - Family Medicine   Independent Historians: Patient    HPI:  Chief Complaint: Knee pain  A complete HPI/ROS/PMH/PSH/SH/FH are unobtainable due to: None    Chronic or social conditions impacting patient care (social determinants of health): None    Context: Mino Aguila III is a 68 y.o. male who presents to the ED c/o right knee pain after mechanical fall that occurred yesterday evening.  Patient reports previous arthroscopy to the right knee.  He reports initially he was able to bear some weight but is having increased pain and swelling to the area.  No head injury.  He does not take any blood thinners.    Review of prior external notes and/or external test results outside of this encounter: DEXA bone density scan showed normal bone mineralization on 8/1/2023.      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    PAST SURGICAL HISTORY  Past Surgical History:   Procedure Laterality Date    BUNIONECTOMY      CARPAL TUNNEL RELEASE Bilateral 1988    FOOT SURGERY  2006    PAROTIDECTOMY Left 1992    NO COMPLICATIONS    REPLACEMENT TOTAL KNEE Left 2008    ROTATOR CUFF REPAIR Left 1992    NO COMPLICATIONS    ROTATOR CUFF REPAIR Right 2011         FAMILY HISTORY  Family History   Problem Relation Age of Onset    Emphysema Mother     Lung cancer Father          SOCIAL HISTORY  Social History     Socioeconomic History    Marital  status:     Number of children: 1    Years of education: 16    Highest education level: Associate degree: academic program   Tobacco Use    Smoking status: Never    Smokeless tobacco: Never    Tobacco comments:     social in college   Vaping Use    Vaping Use: Never used   Substance and Sexual Activity    Alcohol use: Not Currently     Comment: socially;  caffeine use- denies    Drug use: No    Sexual activity: Defer         ALLERGIES  Patient has no known allergies.        REVIEW OF SYSTEMS  Review of Systems   Musculoskeletal:  Positive for arthralgias (Right knee) and joint swelling.        All systems reviewed and negative except for those discussed in HPI.       PHYSICAL EXAM    I have reviewed the triage vital signs and nursing notes.    ED Triage Vitals [12/24/23 9495]   Temp Heart Rate Resp BP SpO2   98.9 °F (37.2 °C) 83 18 174/84 96 %      Temp src Heart Rate Source Patient Position BP Location FiO2 (%)   Tympanic Monitor Sitting Left arm --       Physical Exam  GENERAL: alert, no acute distress  SKIN: Warm, dry  HENT: Normocephalic, atraumatic  EYES: no scleral icterus  CV: regular rhythm, regular rate  RESPIRATORY: normal effort, lungs clear  ABDOMEN: soft, nontender, nondistended  MUSCULOSKELETAL: Abrasions overlying right anterior knee, diffuse swelling to the right anterior knee with tenderness to palpation  NEURO: alert, moves all extremities, follows commands          LAB RESULTS  No results found for this or any previous visit (from the past 24 hour(s)).    Ordered the above labs and independently reviewed and interpreted the results.        RADIOLOGY  CT Lower Extremity Right Without Contrast    Result Date: 12/24/2023  CT OF THE RIGHT KNEE WITHOUT CONTRAST  INDICATION: Right knee pain and injury  TECHNIQUE: CT scan of the right knee was performed without contrast. Coronal and sagittal reformatted images were obtained.  COMPARISON: Right knee x-ray from earlier today  FINDINGS: No evidence  of fracture or dislocation. Mild tricompartmental degenerative changes. Minimal knee joint effusion.      No acute findings  Radiation dose reduction techniques were utilized, including automated exposure control and exposure modulation based on body size.   This report was finalized on 12/24/2023 9:51 AM by Dr. Aníbal Casanova M.D on Workstation: [id number]      XR Knee 3 View Right    Result Date: 12/24/2023  3 VIEWS RIGHT KNEE  HISTORY: Fall  COMPARISON: None available.  FINDINGS: No acute fracture or subluxation of the right knee is identified. Patient has degenerative changes, most significant within the patellofemoral compartment. There is an effusion. There is soft tissue swelling seen overlying the anterior aspect of the knee.      No acute fracture or subluxation identified.  This report was finalized on 12/24/2023 5:31 AM by Dr. Beverly Gómez M.D on Workstation: [id number]       I ordered the above noted radiological studies. Independently reviewed and interpreted by me.  See dictation for official radiology interpretation.      PROCEDURES    Procedures      MEDICATIONS GIVEN IN ER    Medications   HYDROcodone-acetaminophen (NORCO) 5-325 MG per tablet 1 tablet (1 tablet Oral Given 12/24/23 0821)         PROGRESS, DATA ANALYSIS, CONSULTS, AND MEDICAL DECISION MAKING    All labs have been independently reviewed and interpreted by me.  All radiology studies have been independently reviewed and interpreted by me and discussed with radiologist dictating the report.   EKG's independently reviewed and interpreted by me.  Discussion below represents my analysis of pertinent findings related to patient's condition, differential diagnosis, treatment plan and final disposition.    Differential diagnosis: Contusion, fracture, dislocation, sprain, ligament tear    ED Course as of 12/24/23 1032   Sun Dec 24, 2023   0822 XR Knee 3 View Right  Radiology study independently interpreted by me and my findings are no  displaced fracture.   [DC]   0904 Pt not able to put weight on right lower extremity. Will obtain CT imaging for further evaluation. [DC]   1032 CT imaging does not show evidence of fracture.  Placed in Ace wrap to aid with swelling.  Patient has crutches he will use at home.  Will give pain medication for home and have follow-up with orthopedics. [DC]      ED Course User Index  [DC] Richa Alicea PA             AS OF 10:32 EST VITALS:    BP - 174/84  HR - 83  TEMP - 98.9 °F (37.2 °C) (Tympanic)  O2 SATS - 96%        DIAGNOSIS  Final diagnoses:   Sprain of right knee, unspecified ligament, initial encounter         DISPOSITION  ED Disposition       ED Disposition   Discharge    Condition   Stable    Comment   --                  Note Disclaimer: At [facility], we believe that sharing information builds trust and better relationships. You are receiving this note because you recently visited [facility]. It is possible you will see health information before a provider has talked with you about it. This kind of information can be easy to misunderstand. To help you fully understand what it means for your health, we urge you to discuss this note with your provider.         Richa Alicea PA  12/24/23 1032

## 2023-12-24 NOTE — ED PROVIDER NOTES
MD ATTESTATION NOTE    The HARRIS and I have discussed this patient's history, physical exam, and treatment plan.  I have reviewed the documentation and personally had a face to face interaction with the patient. I affirm the documentation and agree with the treatment and plan.  The attached note describes my personal findings.          Brief HPI: Patient presents for evaluation of injury sustained during an accidental fall yesterday evening while he was walking and carrying some Otis presents at the same time.  He unfortunately stumbled over a concrete planter and landed directly on his right knee.  This morning he has had increased pain and swelling making it difficult to ambulate or bear weight.    PHYSICAL EXAM  ED Triage Vitals [12/24/23 0457]   Temp Heart Rate Resp BP SpO2   98.9 °F (37.2 °C) 83 18 174/84 96 %      Temp src Heart Rate Source Patient Position BP Location FiO2 (%)   Tympanic Monitor Sitting Left arm --         GENERAL: No diaphoresis, no acute distress  HENT: nares patent, normocephalic, atraumatic  EYES: no scleral icterus, normal conjunctivae  CV: Normal pulses, normal rate  RESPIRATORY: normal effort, no stridor  MUSCULOSKELETAL: no deformity evident.  There is a bandage over the abrasion area of the right anterior knee.  The right knee is tender to palpation with decreased range of motion for flexion because of tenderness on movement.  Distal neurovascular exam is normal.  NEURO: alert, moves all extremities, follows commands  PSYCH:  calm, cooperative  SKIN: warm, dry    Vital signs and nursing notes reviewed.        Plan: Plain film imaging ordered for the right knee to evaluate for acute osseous injury. -This study was unremarkable  CT scan of the lower extremity also ordered to evaluate for any occult fracture. -This study was also unremarkable.  Patient advised to rest and ice and elevate the injured knee and follow-up with his orthopedist.  Will provide pain medications and have him  continue to use crutches for ambulatory assistance.       Chip Edwards MD  12/24/23 9520

## 2024-04-02 RX ORDER — ASPIRIN 81 MG/1
TABLET, COATED ORAL
Qty: 90 TABLET | Refills: 1 | Status: SHIPPED | OUTPATIENT
Start: 2024-04-02

## 2024-07-17 ENCOUNTER — PREP FOR SURGERY (OUTPATIENT)
Dept: SURGERY | Facility: SURGERY CENTER | Age: 69
End: 2024-07-17
Payer: MEDICARE

## 2024-07-17 ENCOUNTER — OFFICE VISIT (OUTPATIENT)
Dept: GASTROENTEROLOGY | Facility: CLINIC | Age: 69
End: 2024-07-17
Payer: MEDICARE

## 2024-07-17 VITALS
HEART RATE: 61 BPM | TEMPERATURE: 97.7 F | SYSTOLIC BLOOD PRESSURE: 162 MMHG | WEIGHT: 188.1 LBS | BODY MASS INDEX: 29.52 KG/M2 | DIASTOLIC BLOOD PRESSURE: 84 MMHG | OXYGEN SATURATION: 98 % | HEIGHT: 67 IN

## 2024-07-17 DIAGNOSIS — R13.19 ESOPHAGEAL DYSPHAGIA: ICD-10-CM

## 2024-07-17 DIAGNOSIS — Z12.11 COLON CANCER SCREENING: ICD-10-CM

## 2024-07-17 DIAGNOSIS — K21.9 GASTROESOPHAGEAL REFLUX DISEASE, UNSPECIFIED WHETHER ESOPHAGITIS PRESENT: Chronic | ICD-10-CM

## 2024-07-17 DIAGNOSIS — K21.9 GASTROESOPHAGEAL REFLUX DISEASE, UNSPECIFIED WHETHER ESOPHAGITIS PRESENT: ICD-10-CM

## 2024-07-17 DIAGNOSIS — K22.70 BARRETT'S ESOPHAGUS WITHOUT DYSPLASIA: Primary | Chronic | ICD-10-CM

## 2024-07-17 DIAGNOSIS — R13.19 ESOPHAGEAL DYSPHAGIA: Chronic | ICD-10-CM

## 2024-07-17 DIAGNOSIS — K22.70 BARRETT'S ESOPHAGUS WITHOUT DYSPLASIA: Primary | ICD-10-CM

## 2024-07-17 PROCEDURE — 99214 OFFICE O/P EST MOD 30 MIN: CPT | Performed by: NURSE PRACTITIONER

## 2024-07-17 PROCEDURE — 1160F RVW MEDS BY RX/DR IN RCRD: CPT | Performed by: NURSE PRACTITIONER

## 2024-07-17 PROCEDURE — 1159F MED LIST DOCD IN RCRD: CPT | Performed by: NURSE PRACTITIONER

## 2024-07-17 RX ORDER — FLUOROURACIL 50 MG/G
1 CREAM TOPICAL 2 TIMES DAILY
COMMUNITY

## 2024-07-17 RX ORDER — SODIUM CHLORIDE, SODIUM LACTATE, POTASSIUM CHLORIDE, CALCIUM CHLORIDE 600; 310; 30; 20 MG/100ML; MG/100ML; MG/100ML; MG/100ML
30 INJECTION, SOLUTION INTRAVENOUS CONTINUOUS PRN
OUTPATIENT
Start: 2024-07-17

## 2024-07-17 RX ORDER — PREDNISONE 1 MG/1
4 TABLET ORAL DAILY
COMMUNITY

## 2024-07-17 RX ORDER — OMEPRAZOLE 20 MG/1
20 CAPSULE, DELAYED RELEASE ORAL DAILY
Qty: 90 CAPSULE | Refills: 3 | Status: SHIPPED | OUTPATIENT
Start: 2024-07-17

## 2024-07-17 RX ORDER — SODIUM CHLORIDE 0.9 % (FLUSH) 0.9 %
3 SYRINGE (ML) INJECTION EVERY 12 HOURS SCHEDULED
OUTPATIENT
Start: 2024-07-17

## 2024-07-17 RX ORDER — HYDROCODONE BITARTRATE AND ACETAMINOPHEN 7.5; 325 MG/1; MG/1
1 TABLET ORAL EVERY 6 HOURS PRN
COMMUNITY
Start: 2024-06-06

## 2024-07-17 RX ORDER — DOCUSATE SODIUM 100 MG/1
CAPSULE, LIQUID FILLED ORAL
COMMUNITY

## 2024-07-17 RX ORDER — TRIAMCINOLONE ACETONIDE 1 MG/G
OINTMENT TOPICAL
COMMUNITY
Start: 2024-07-16

## 2024-07-17 RX ORDER — METOPROLOL SUCCINATE 100 MG/1
1 TABLET, EXTENDED RELEASE ORAL
COMMUNITY

## 2024-07-17 RX ORDER — SODIUM CHLORIDE 0.9 % (FLUSH) 0.9 %
10 SYRINGE (ML) INJECTION AS NEEDED
OUTPATIENT
Start: 2024-07-17

## 2024-07-17 NOTE — PATIENT INSTRUCTIONS
1.  For Aj's esophagus and GERD continue omeprazole 20 mg once daily. Refills have been sent to your pharmacy.     2. For his esophagus and GERD, we recommend avoiding eating 3-4 hours before bedtime, eating smaller more frequent meals, and avoiding any known food triggers including spicy foods, tomatoes and tomato-based sauces, chocolate, coffee/tea, citrus fruits, carbonated  beverages and alcohol.     3.  We will schedule EGD for surveillance of Aj's esophagus with possible esophageal dilation due to dysphagia.    4.   We will first schedule an esophagram for further evaluation of dysphagia prior to your EGD to provide us with additional information prior to your procedure.    5.  Next colonoscopy for colon cancer screening due 2027.     6.  Recommend office follow-up 4 weeks after scope for reassessment of symptoms and to discuss results.

## 2024-07-17 NOTE — PROGRESS NOTES
"Chief Complaint   Patient presents with    Aj's esophagus, GERD, dysphagia         History of Present Illness  69-year-old male presents to the office today for evaluation of Aj's esophagus.  His wife is accompanying him on his office visit today.  He continues omeprazole 20 mg once daily.  Last EGD was performed on 6/22/2021 revealing findings consistent with Aj's and gastric polyps. He is currently taking omeprazole 20 mg once daily with good control of GERD.     He reports dysphagia that occurs at least once per day. Symptoms are more likely to occur with breads and meats. He has never had an esophageal dilation in the past. He underwent a swallowing study years ago which revealed bony protrusion in the cervical spine (osteophyte). Difficulties were noted with puree and regular consistency. He also demonstrated some sluggish dysmotility with solids in his lower esophagus. Dietary recommendations provided to patient.     Last colonoscopy was performed in 2017. He reports a history of diverticulosis. He denies any personal history of colon polyps or family history of colon cancer. Next colonoscopy due 2017. He denies any changes in bowel habits. He denies any melena or hematochezia.      Result Review :       FL Video Swallow With Speech (11/28/2016 09:43)   Progress Notes by Za Burk MA,CCC-SLP (11/28/2016 13:22)   Vital Signs:   /84   Pulse 61   Temp 97.7 °F (36.5 °C)   Ht 170.2 cm (67\")   Wt 85.3 kg (188 lb 1.6 oz)   SpO2 98%   BMI 29.46 kg/m²     Body mass index is 29.46 kg/m².     Physical Exam  Vitals reviewed.   Constitutional:       Appearance: Normal appearance.   HENT:      Head: Normocephalic.      Nose: Nose normal.      Mouth/Throat:      Mouth: Mucous membranes are moist.   Eyes:      General: No scleral icterus.     Extraocular Movements: Extraocular movements intact.   Cardiovascular:      Rate and Rhythm: Normal rate and regular rhythm.      Pulses: Normal " pulses.      Heart sounds: Normal heart sounds.   Pulmonary:      Effort: Pulmonary effort is normal. No respiratory distress.      Breath sounds: Normal breath sounds.   Abdominal:      General: Abdomen is flat. Bowel sounds are normal. There is no distension.      Palpations: Abdomen is soft. There is no mass.      Tenderness: There is no abdominal tenderness. There is no guarding.   Musculoskeletal:         General: Normal range of motion.      Cervical back: Normal range of motion and neck supple.   Skin:     General: Skin is warm and dry.      Coloration: Skin is not jaundiced.   Neurological:      General: No focal deficit present.      Mental Status: He is alert and oriented to person, place, and time.   Psychiatric:         Mood and Affect: Mood normal.         Behavior: Behavior normal.         Thought Content: Thought content normal.         Judgment: Judgment normal.       Assessment and Plan    Diagnoses and all orders for this visit:    1. Aj's esophagus without dysplasia (Primary)  -     FL ESOPHAGRAM DOUBLE CONTRAST; Future    2. Esophageal dysphagia  -     FL ESOPHAGRAM DOUBLE CONTRAST; Future    3. Gastroesophageal reflux disease, unspecified whether esophagitis present  -     FL ESOPHAGRAM DOUBLE CONTRAST; Future    4. Colon cancer screening    Other orders  -     omeprazole (priLOSEC) 20 MG capsule; Take 1 capsule by mouth Daily.  Dispense: 90 capsule; Refill: 3           Patient Instructions   1.  For Aj's esophagus and GERD continue omeprazole 20 mg once daily. Refills have been sent to your pharmacy.     2. For his esophagus and GERD, we recommend avoiding eating 3-4 hours before bedtime, eating smaller more frequent meals, and avoiding any known food triggers including spicy foods, tomatoes and tomato-based sauces, chocolate, coffee/tea, citrus fruits, carbonated  beverages and alcohol.     3.  We will schedule EGD for surveillance of Aj's esophagus with possible esophageal  dilation due to dysphagia.    4.   We will first schedule an esophagram for further evaluation of dysphagia prior to your EGD to provide us with additional information prior to your procedure.    5.  Next colonoscopy for colon cancer screening due 2027.     6.  Recommend office follow-up 4 weeks after scope for reassessment of symptoms and to discuss results.      Discussion:   Patient to continue omeprazole 20 mg once daily for Aj's esophagus and GERD.  We will schedule an EGD for surveillance of Aj's esophagus with possible esophageal dilation due to reports of dysphagia.  Prior to EGD we will have an esophagram performed for further evaluation of dysphagia.    Next colonoscopy for colon cancer screening due 2027.  Will plan for office follow-up 4 weeks after procedure to discuss results and reassess symptoms.  Both the patient and his wife verbalized understanding of above plan of care and are in agreement.  All questions answered and support provided.    EMR Dragon/Transcription Disclaimer:  This document has been Dictated utilizing Dragon dictation.

## 2024-08-12 ENCOUNTER — HOSPITAL ENCOUNTER (OUTPATIENT)
Dept: GENERAL RADIOLOGY | Facility: HOSPITAL | Age: 69
Discharge: HOME | End: 2024-08-12
Admitting: NURSE PRACTITIONER
Payer: MEDICARE

## 2024-08-12 DIAGNOSIS — K22.70 BARRETT'S ESOPHAGUS WITHOUT DYSPLASIA: Chronic | ICD-10-CM

## 2024-08-12 DIAGNOSIS — R13.19 ESOPHAGEAL DYSPHAGIA: Chronic | ICD-10-CM

## 2024-08-12 DIAGNOSIS — K21.9 GASTROESOPHAGEAL REFLUX DISEASE, UNSPECIFIED WHETHER ESOPHAGITIS PRESENT: Chronic | ICD-10-CM

## 2024-08-12 PROCEDURE — 74221 X-RAY XM ESOPHAGUS 2CNTRST: CPT

## 2024-08-14 DIAGNOSIS — R93.3 ABNORMAL ESOPHAGRAM: ICD-10-CM

## 2024-08-14 DIAGNOSIS — R13.19 ESOPHAGEAL DYSPHAGIA: Primary | ICD-10-CM

## 2024-08-29 ENCOUNTER — HOSPITAL ENCOUNTER (OUTPATIENT)
Dept: GENERAL RADIOLOGY | Facility: HOSPITAL | Age: 69
Discharge: HOME | End: 2024-08-29
Admitting: NURSE PRACTITIONER
Payer: MEDICARE

## 2024-08-29 DIAGNOSIS — R13.19 ESOPHAGEAL DYSPHAGIA: ICD-10-CM

## 2024-08-29 DIAGNOSIS — R93.3 ABNORMAL ESOPHAGRAM: ICD-10-CM

## 2024-08-29 PROCEDURE — 92611 MOTION FLUOROSCOPY/SWALLOW: CPT | Performed by: SPEECH-LANGUAGE PATHOLOGIST

## 2024-08-29 PROCEDURE — 74230 X-RAY XM SWLNG FUNCJ C+: CPT

## 2024-08-29 RX ADMIN — BARIUM SULFATE 4 ML: 980 POWDER, FOR SUSPENSION ORAL at 13:43

## 2024-08-29 RX ADMIN — BARIUM SULFATE 1 TEASPOON(S): 0.6 CREAM ORAL at 13:43

## 2024-08-29 RX ADMIN — BARIUM SULFATE 55 ML: 0.81 POWDER, FOR SUSPENSION ORAL at 13:43

## 2024-08-29 NOTE — THERAPY EVALUATION
Outpatient Speech Language Pathology   Adult Swallow Initial Evaluation-VFSS  [facility]     Patient Name: Mino Aguila III  : 1955  MRN: [id number]  Today's Date: 2024         Visit Date: 2024   Patient Active Problem List   Diagnosis    Pharyngeal dysphagia    Osteophyte of cervical spine    Aj's esophagus without dysplasia    Esophageal dysphagia    Gastroesophageal reflux disease        Past Medical History:   Diagnosis Date    Cancer     melanoma    Enlarged prostate     GERD (gastroesophageal reflux disease)     Gout     Gout     History of kidney cancer     Hyperlipidemia     Hypertension     Melanoma         Past Surgical History:   Procedure Laterality Date    BUNIONECTOMY      CARPAL TUNNEL RELEASE Bilateral 1988    FOOT SURGERY  2006    KNEE CARTILAGE SURGERY      PAROTIDECTOMY Left 1992    NO COMPLICATIONS    REPLACEMENT TOTAL KNEE Left 2008    ROTATOR CUFF REPAIR Left 1992    NO COMPLICATIONS    ROTATOR CUFF REPAIR Right 2011    TIBIA FRACTURE SURGERY           Visit Dx:     ICD-10-CM ICD-9-CM   1. Esophageal dysphagia  R13.19 787.29   2. Abnormal esophagram  R93.3 793.4            OP SLP Assessment/Plan - 24 1636          SLP Assessment    Functional Problems Swallowing  -SA    Impact on Function: Swallowing Risk of aspiration;Risk of pneumonia  -SA    Clinical Impression: Swallowing Mild-Moderate:  -SA    Prognosis Good (comment)  -SA    Patient/caregiver participated in establishment of treatment plan and goals Yes  -SA    Patient would benefit from skilled therapy intervention No  -SA       SLP Plan    Frequency eval only  -SA              User Key  (r) = Recorded By, (t) = Taken By, (c) = Cosigned By      Initials Name Provider Type    Divine Dickinson SLP Speech and Language Pathologist                     SLP Adult Swallow Evaluation       Row Name 24 1315       Rehab Evaluation    Document Type evaluation;other (see comments)  VFSS  -SA     Subjective Information complains of  food sticking  -SA    Patient Observations alert;cooperative  -SA    Patient Effort good  -SA    Symptoms Noted During/After Treatment none  -SA       General Information    Patient Profile Reviewed yes  -SA    Pertinent History Of Current Problem h/o Barretts esophagus, bone spur, reflux  -SA    Current Method of Nutrition regular textures;thin liquids  -SA    Precautions/Limitations, Vision WFL;for purposes of eval  -SA    Precautions/Limitations, Hearing hearing aid, bilaterally;WFL;for purposes of eval  -SA    Prior Level of Function-Communication WFL  -SA    Prior Level of Function-Swallowing other (see comments)  VFSS 2026 showing pharyngeal residue placing pt at higher risk of aspiration, reg/thin rec w/ soft/moist foods  -SA    Plans/Goals Discussed with patient;agreed upon  -SA    Barriers to Rehab previous functional deficit  -SA       MBS/VFSS    Utensils Used spoon;cup;straw  -SA    Consistencies Trialed regular textures;soft to chew textures;mixed consistency;pureed;thin liquids  -SA       MBS/VFSS Interpretation    Oral Prep Phase WFL  -SA    Oral Transit Phase impaired  -SA    Oral Residue WFL  -SA       Oral Transit Phase    Impaired Oral Transit Phase premature spillage of liquids into pharynx  -SA    Premature Spillage of Liquids into Pharynx all consistencies tested  -SA       Initiation of Pharyngeal Swallow    Pharyngeal Phase impaired pharyngeal phase of swallowing  -SA    Penetration Before the Swallow thin liquids;other (see comments)  inconsistent; redirection of spillage from osteophyte into laryngeal vestibule  -SA    Penetration During the Swallow thin liquids;other (see comments);secondary to reduced vestibular closure  inconsistent  -SA    Penetration After the Swallow thin liquids;secondary to residue;other (see comments)  transfer from resiude on lateral channels to laryngeal vestibule  -SA    Aspiration After the Swallow thin liquids;other (see  comments)  trace as liquid wash to clear dry solid residue  -SA    Depth of Penetration other (see comments)  varied and inconsistent  -SA    Response to Penetration Yes  -SA    Responsed to penetration with inconsistent;delayed;spontaneous swallow;cough;effective  -SA    Response to Aspiration Yes  -SA    Responded to aspiration with delayed;cough;effective;spontaneous swallow  -SA    Pharyngeal Residue valleculae;posterior pharyngeal wall;other (see comments);pudding/puree;regular textures  felt largely impacted by cervical osteophyte complex; residue with 1/2 dry solid boluses  -SA    Response to Residue partial residue clearance;other (see comments)  cleared residue of soft solids w/ swallow x3  -SA    Attempted Compensatory Maneuvers multiple swallows;alternate liquids/solids  -SA    Response to Attempted Compensatory Maneuvers reduced residue  -SA    Pharyngeal Phase, Comment Patient presents with cervical osteophyte complex resulting in protrusion into pharynx. Reduced epiglottic deflection felt to be impacted by position of osteophyte. Patient demonstrated inconsistent trace penetration of thin liquids with trace aspiration of thin liquid wash following dry solid bolus/pharyngeal residue. Premature spillage demonstrated across consistencies. Moderate pharyngeal residue observed with puree and dry solidsx1. One trial of dry solid did result in minimal pharyngeal residue. Soft moist solids and mixed consistency required 3 swallows (spontaenous) to clear pharyngeal residue.  -SA       SLP Communication to Radiology    Summary Statement VFSS completed in conjunction with Tanya Dale NP.  Patient presents with cervical osteophyte complex resulting in protrusion into pharynx.  Reduced epiglottic deflection felt to be impacted by position of osteophyte.  Patient demonstrated inconsistent trace penetration of thin liquids with trace aspiration of thin liquid wash.  Moderate pharyngeal residue observed with puree and  dry solids.  -SA       SLP Evaluation Clinical Impression    SLP Swallowing Diagnosis mild-moderate;pharyngeal dysphagia;other (see comments)  d/t presence of osteophyte complex impairing clearance of bolus  -SA    Functional Impact risk of aspiration/pneumonia  -SA       Recommendations    Therapy Frequency (Swallow) evaluation only  -SA    SLP Diet Recommendation regular textures;thin liquids;other (see comments)  moist foods and/or use of sauces/gravies  -SA    Recommended Precautions and Strategies upright posture during/after eating;small bites of food and sips of liquid  -SA    Oral Care Recommendations Oral Care BID/PRN  -SA    SLP Rec. for Method of Medication Administration as tolerated  -SA              User Key  (r) = Recorded By, (t) = Taken By, (c) = Cosigned By      Initials Name Provider Type    Divine Dickinson SLP Speech and Language Pathologist                                   OP SLP Education       Row Name 08/29/24 1636       Education    Barriers to Learning No barriers identified  -SA    Education Provided Described results of evaluation;Patient expressed understanding of evaluation  -SA    Assessed Learning needs;Learning motivation  -    Learning Motivation Strong  -    Learning Method Explanation  -    Teaching Response Verbalized understanding  -              User Key  (r) = Recorded By, (t) = Taken By, (c) = Cosigned By      Initials Name Effective Dates     Divine Temple SLP 01/11/24 -                                Time Calculation:   SLP Start Time: 1315    Therapy Charges for Today       Code Description Service Date Service Provider Modifiers Qty    [id number]  ST MOTION FLUORO EVAL SWALLOW 6 8/29/2024 Divine Temple SLP GN 1                     GUNNAR Duque  8/29/2024

## 2024-09-09 ENCOUNTER — TELEPHONE (OUTPATIENT)
Dept: GASTROENTEROLOGY | Facility: CLINIC | Age: 69
End: 2024-09-09
Payer: MEDICARE

## 2024-09-09 NOTE — TELEPHONE ENCOUNTER
RN received call back from patient. Pt and RN discussed the results from patients most recent swallow study. Pt is still agreeable to EGD which is scheduled for 9/19. EL

## 2024-09-09 NOTE — TELEPHONE ENCOUNTER
RN called to review results and recommendations from recent swallow study. Left voicemail to return call. Em Vernon APRN Lovett, Emily, RN  Please contact the patient and his wife and reviewed results.  He has an upcoming EGD scheduled which I would recommend that he continue to keep his appointment for.  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  He has some reduced epiglottic deflection which is secondary to the position of the osteophyte.  He had some inconsistent trace penetration of thin liquids with a trace of aspiration of thin liquid wash followed by dry solid bolus and some residue in his pharyngeal area.  He did have some premature spillage with all consistencies.  There was some moderate pharyngeal residue with.  And dry solids x 1.  1 trial of dry solid resulted and minimal pharyngeal residue.  Soft moist solids and mixed consistency required 3 swallows in order to clear the residue from the back of his throat.    We will plan to proceed with EGD for further evaluation.  Based upon these findings he does have risk for aspiration and pneumonia.  He is recommended to consume a regular textures and thin liquids.  Patient to remain upright during eating.  We will provide additional recommendations following EGD.  Thank you.  Em BRAVO

## 2024-09-12 ENCOUNTER — OFFICE VISIT (OUTPATIENT)
Dept: CARDIOLOGY | Facility: CLINIC | Age: 69
End: 2024-09-12
Payer: MEDICARE

## 2024-09-12 VITALS
SYSTOLIC BLOOD PRESSURE: 140 MMHG | DIASTOLIC BLOOD PRESSURE: 80 MMHG | WEIGHT: 192 LBS | BODY MASS INDEX: 30.13 KG/M2 | HEART RATE: 55 BPM | HEIGHT: 67 IN

## 2024-09-12 DIAGNOSIS — I25.10 CORONARY ARTERY CALCIFICATION: Primary | ICD-10-CM

## 2024-09-12 DIAGNOSIS — I25.84 CORONARY ARTERY CALCIFICATION: Primary | ICD-10-CM

## 2024-09-12 PROCEDURE — 1160F RVW MEDS BY RX/DR IN RCRD: CPT | Performed by: NURSE PRACTITIONER

## 2024-09-12 PROCEDURE — 1159F MED LIST DOCD IN RCRD: CPT | Performed by: NURSE PRACTITIONER

## 2024-09-12 PROCEDURE — 99214 OFFICE O/P EST MOD 30 MIN: CPT | Performed by: NURSE PRACTITIONER

## 2024-09-12 PROCEDURE — 93000 ELECTROCARDIOGRAM COMPLETE: CPT | Performed by: NURSE PRACTITIONER

## 2024-09-12 RX ORDER — FLUTICASONE PROPIONATE 50 MCG
2 SPRAY, SUSPENSION (ML) NASAL DAILY
COMMUNITY

## 2024-09-12 RX ORDER — MELATONIN 10 MG
CAPSULE ORAL AS NEEDED
COMMUNITY

## 2024-09-12 NOTE — PROGRESS NOTES
"Date of Office Visit: 24  Encounter Provider: LAWRENCE Elizabeth  Place of Service: [facility] CARDIOLOGY  Patient Name: Mino Aguila III  :1955    Chief Complaint   Patient presents with    Coronary artery disease involving native coronary artery of    Follow-up   :     HPI: Mino Aguila III is a 69 y.o. male  with  coronary artery calcification noted on CT, dilated ascending aorta, chronic kidney disease, PMR, obstructive sleep apnea, carotid plaque bilateral, first degree Av block.        He is followed by Dr. Jorje Brady.  I will visit with him in follow-up and have reviewed his medical record.     He had a nonischemic perfusion stress test 2018.  He was found to have coronary artery calcification on CT of the chest.  He then had a stress echo which showed no evidence of ischemia in 2022.  There was no significant valve disease and mild dilation of the sinuses of Valsalva was present at 4.0 cm.       He presents today for reassessment.  He is in physical therapy for his leg.  He fell off a ladder [place] 3 and broke 2 ribs on the left.  He had some chest pain at that time but since then his chest pain has resolved.  He has been more sedentary over the last 2 months but dug a hole recently and had some heart racing afterwards.  His wife who is a retired nurse, checked his pulse which was regular but fast.  This resolved after few minutes of sitting.  He checks blood pressure routinely at home which is usually 112-128/60-80.  No blood in the urine or stool.  Compliant with CPAP.  He is scheduled for EGD next week.          No Known Allergies        Family and social history reviewed.     ROS  All other systems were reviewed and are negative          Objective:     Vitals:    24 1013   BP: 140/80   BP Location: Left arm   Patient Position: Sitting   Pulse: 55   Weight: 87.1 kg (192 lb)   Height: 170.2 cm (67\")     Body mass index is 30.07 " kg/m².    PHYSICAL EXAM:  Pulmonary:      Effort: Pulmonary effort is normal.      Breath sounds: Normal breath sounds.   Cardiovascular:      Normal rate. Regular rhythm.      Murmurs: There is a grade 1/6 systolic murmur at the LLSB.           ECG 12 Lead    Date/Time: 9/12/2024 10:41 AM  Performed by: Alyssa Lu APRN    Authorized by: Alyssa Lu APRN  Comparison: compared with previous ECG   Similar to previous ECG  Rhythm: sinus rhythm  Rate: normal  Conduction: 1st degree AV block  QRS axis: normal            Current Outpatient Medications   Medication Sig Dispense Refill    acetaminophen (TYLENOL) 325 MG tablet Take 1 tablet by mouth.      allopurinol (ZYLOPRIM) 300 MG tablet Take 1 tablet by mouth Daily.      Aspirin Low Dose 81 MG EC tablet TAKE 1 TABLET BY MOUTH EVERY DAY 90 tablet 1    atorvastatin (LIPITOR) 20 MG tablet Take 1 tablet by mouth Daily.      doxazosin (CARDURA) 8 MG tablet Take 1 tablet by mouth Every Night.      fluticasone (FLONASE) 50 MCG/ACT nasal spray 2 sprays into the nostril(s) as directed by provider Daily.      Melatonin 10 MG capsule As Needed.      metoprolol succinate XL (TOPROL-XL) 100 MG 24 hr tablet Take 1 tablet by mouth.      omeprazole (priLOSEC) 20 MG capsule Take 1 capsule by mouth Daily. 90 capsule 3    tadalafil (CIALIS) 5 MG tablet Take 1 tablet by mouth Daily As Needed for Erectile Dysfunction.      TURMERIC PO Take  by mouth Daily.       No current facility-administered medications for this visit.     Assessment:       Diagnosis Plan   1. Coronary artery calcification  ECG 12 Lead           Orders Placed This Encounter   Procedures    ECG 12 Lead     This order was created via procedure documentation     Order Specific Question:   Release to patient     Answer:   Routine Release [[id number]]         Plan:       1.  69-year-old gentleman with coronary artery calcification noted on prior CT.  Nonischemic stress echo March 2022.  Continue aspirin and  atorvastatin.  -Encourage increase physical activity.  He will call with any issues otherwise follow-up in 1 year  2.  Hypertension-blood pressure appears adequately controlled  3.  Hyperlipidemia on atorvastatin 20 mg.  Target LDL less than 70  4.  Chronic kidney disease-follows routinely with nephrology  5.  Obstructive sleep apnea-he reports compliance with CPAP.  6.  Dilated ascending aorta at the sinus of Valsalva on stress echo March 2022  7.  History of left renal carcinoma.  He follows closely with urology and has serial CT imaging.  He had a negative PET study in January of this year through [place]  8.  Bilateral carotid artery plaque without stenosis on duplex November 2022  9.  History of first-degree AV block-intermittent since 2019  10.  Right lung nodule-stable on last imaging  11.  PMR-following with Dr. Casanova with rheumatology and currently on steroid taper  12.  Palpitations-associated with exertion.  He will monitor for pattern and let me know if he continues to have issues, we will consider external mobile telemetry.  We also discussed Apple Watch versus [employer] mobile bridgette for EKG monitoring at home  13. History of PACs in recovery on stress echo 03/2022  14.  Left lateral fifth and sixth rib fracture [place]inna 3, 2024 after a fall off a ladder.  15.  Abdominal atherosclerotic disease noted on CT June 2024-continue aspirin statin therapy  16.  History of Aj's esophagus-scheduled for repeat EGD 9/19/2024 with Dr. Morteza Kennedy.  He may hold aspirin 3 days prior and does not require any additional testing to proceed  Follow-up in cardiology clinic in 1 year.  Call with any issues          It has been a pleasure to participate in this patient's care.      Thank you,  LAWRENCE Elizabeth      **I used Dragon to dictate this note:**

## 2024-09-19 ENCOUNTER — ANESTHESIA (OUTPATIENT)
Dept: SURGERY | Facility: SURGERY CENTER | Age: 69
End: 2024-09-19
Payer: MEDICARE

## 2024-09-19 ENCOUNTER — ANESTHESIA EVENT (OUTPATIENT)
Dept: SURGERY | Facility: SURGERY CENTER | Age: 69
End: 2024-09-19
Payer: MEDICARE

## 2024-09-19 ENCOUNTER — HOSPITAL ENCOUNTER (OUTPATIENT)
Facility: SURGERY CENTER | Age: 69
Setting detail: HOSPITAL OUTPATIENT SURGERY
Discharge: HOME | End: 2024-09-19
Attending: INTERNAL MEDICINE | Admitting: INTERNAL MEDICINE
Payer: MEDICARE

## 2024-09-19 VITALS
SYSTOLIC BLOOD PRESSURE: 137 MMHG | RESPIRATION RATE: 16 BRPM | HEIGHT: 68 IN | BODY MASS INDEX: 28.95 KG/M2 | OXYGEN SATURATION: 96 % | WEIGHT: 191 LBS | DIASTOLIC BLOOD PRESSURE: 88 MMHG | TEMPERATURE: 98 F | HEART RATE: 62 BPM

## 2024-09-19 DIAGNOSIS — K21.9 GASTROESOPHAGEAL REFLUX DISEASE, UNSPECIFIED WHETHER ESOPHAGITIS PRESENT: ICD-10-CM

## 2024-09-19 DIAGNOSIS — K22.70 BARRETT'S ESOPHAGUS WITHOUT DYSPLASIA: ICD-10-CM

## 2024-09-19 DIAGNOSIS — R13.19 ESOPHAGEAL DYSPHAGIA: ICD-10-CM

## 2024-09-19 PROCEDURE — 25010000002 PROPOFOL 1000 MG/100ML EMULSION: Performed by: STUDENT IN AN ORGANIZED HEALTH CARE EDUCATION/TRAINING PROGRAM

## 2024-09-19 PROCEDURE — 88305 TISSUE EXAM BY PATHOLOGIST: CPT | Performed by: INTERNAL MEDICINE

## 2024-09-19 PROCEDURE — 25810000003 LACTATED RINGERS PER 1000 ML: Performed by: NURSE PRACTITIONER

## 2024-09-19 PROCEDURE — 43239 EGD BIOPSY SINGLE/MULTIPLE: CPT | Performed by: INTERNAL MEDICINE

## 2024-09-19 PROCEDURE — 88342 IMHCHEM/IMCYTCHM 1ST ANTB: CPT | Performed by: INTERNAL MEDICINE

## 2024-09-19 PROCEDURE — 25010000002 GLYCOPYRROLATE PF 0.2 MG/ML SOLUTION: Performed by: STUDENT IN AN ORGANIZED HEALTH CARE EDUCATION/TRAINING PROGRAM

## 2024-09-19 PROCEDURE — 25010000002 PROPOFOL 10 MG/ML EMULSION: Performed by: STUDENT IN AN ORGANIZED HEALTH CARE EDUCATION/TRAINING PROGRAM

## 2024-09-19 PROCEDURE — 25810000003 LACTATED RINGERS PER 1000 ML: Performed by: STUDENT IN AN ORGANIZED HEALTH CARE EDUCATION/TRAINING PROGRAM

## 2024-09-19 RX ORDER — PROPOFOL 10 MG/ML
INJECTION, EMULSION INTRAVENOUS AS NEEDED
Status: DISCONTINUED | OUTPATIENT
Start: 2024-09-19 | End: 2024-09-19 | Stop reason: SURG

## 2024-09-19 RX ORDER — LIDOCAINE HYDROCHLORIDE 20 MG/ML
INJECTION, SOLUTION INFILTRATION; PERINEURAL AS NEEDED
Status: DISCONTINUED | OUTPATIENT
Start: 2024-09-19 | End: 2024-09-19 | Stop reason: SURG

## 2024-09-19 RX ORDER — SODIUM CHLORIDE 0.9 % (FLUSH) 0.9 %
10 SYRINGE (ML) INJECTION AS NEEDED
Status: DISCONTINUED | OUTPATIENT
Start: 2024-09-19 | End: 2024-09-19 | Stop reason: HOSPADM

## 2024-09-19 RX ORDER — SODIUM CHLORIDE 0.9 % (FLUSH) 0.9 %
3 SYRINGE (ML) INJECTION EVERY 12 HOURS SCHEDULED
Status: DISCONTINUED | OUTPATIENT
Start: 2024-09-19 | End: 2024-09-19 | Stop reason: HOSPADM

## 2024-09-19 RX ORDER — SODIUM CHLORIDE, SODIUM LACTATE, POTASSIUM CHLORIDE, CALCIUM CHLORIDE 600; 310; 30; 20 MG/100ML; MG/100ML; MG/100ML; MG/100ML
INJECTION, SOLUTION INTRAVENOUS CONTINUOUS PRN
Status: DISCONTINUED | OUTPATIENT
Start: 2024-09-19 | End: 2024-09-19 | Stop reason: SURG

## 2024-09-19 RX ORDER — SODIUM CHLORIDE, SODIUM LACTATE, POTASSIUM CHLORIDE, CALCIUM CHLORIDE 600; 310; 30; 20 MG/100ML; MG/100ML; MG/100ML; MG/100ML
30 INJECTION, SOLUTION INTRAVENOUS CONTINUOUS PRN
Status: DISCONTINUED | OUTPATIENT
Start: 2024-09-19 | End: 2024-09-19 | Stop reason: HOSPADM

## 2024-09-19 RX ADMIN — LIDOCAINE HYDROCHLORIDE 80 MG: 20 INJECTION, SOLUTION INFILTRATION; PERINEURAL at 13:01

## 2024-09-19 RX ADMIN — PROPOFOL 120 MG: 10 INJECTION, EMULSION INTRAVENOUS at 13:01

## 2024-09-19 RX ADMIN — SODIUM CHLORIDE, POTASSIUM CHLORIDE, SODIUM LACTATE AND CALCIUM CHLORIDE 30 ML/HR: 600; 310; 30; 20 INJECTION, SOLUTION INTRAVENOUS at 11:24

## 2024-09-19 RX ADMIN — SODIUM CHLORIDE, POTASSIUM CHLORIDE, SODIUM LACTATE AND CALCIUM CHLORIDE: 600; 310; 30; 20 INJECTION, SOLUTION INTRAVENOUS at 12:56

## 2024-09-19 RX ADMIN — GLYCOPYRROLATE 0.1 MG: 0.2 INJECTION, SOLUTION INTRAMUSCULAR; INTRAVITREAL at 13:01

## 2024-09-19 RX ADMIN — PROPOFOL 160 MCG/KG/MIN: 10 INJECTION, EMULSION INTRAVENOUS at 13:02

## 2024-09-23 LAB
CYTO UR: NORMAL
LAB AP CASE REPORT: NORMAL
LAB AP CLINICAL INFORMATION: NORMAL
PATH REPORT.ADDENDUM SPEC: NORMAL
PATH REPORT.FINAL DX SPEC: NORMAL
PATH REPORT.GROSS SPEC: NORMAL

## 2025-01-03 ENCOUNTER — TELEPHONE (OUTPATIENT)
Dept: CARDIOLOGY | Facility: CLINIC | Age: 70
End: 2025-01-03
Payer: MEDICARE

## 2025-01-03 NOTE — TELEPHONE ENCOUNTER
Patient is at acceptable cardiovascular risk for left hip surgery.  He may hold aspirin 5 to 7 days prior and resume as soon as possible postprocedure.      LAWRENCE Elizabeth  [facility]   [address]  [place] [address]  Ph: [phone]  Fax: [phone]

## 2025-06-23 ENCOUNTER — TELEPHONE (OUTPATIENT)
Dept: CARDIOLOGY | Age: 70
End: 2025-06-23
Payer: MEDICARE

## 2025-06-23 NOTE — TELEPHONE ENCOUNTER
We received a fax regarding Mr. Aguila.  He is scheduled for right total hip surgery on 11/6/25 with Dr. Quiroz.  They are requesting a cardiac risk assessment and recommendations on pt's ASA.  I placed the fax in your inbox for your review.  Thanks/tian

## 2025-06-23 NOTE — TELEPHONE ENCOUNTER
I think we should see him at his scheduled appointment in September and then address his clearance at that time.  Please save this form and inform patient of plan

## 2025-06-23 NOTE — TELEPHONE ENCOUNTER
I scanned the form in [facility] for his upcoming appointment to address at that time./tian

## (undated) DEVICE — SINGLE-USE BIOPSY FORCEPS: Brand: RADIAL JAW 4

## (undated) DEVICE — MSK ENDO PORT O2 POM ELITE CURAPLEX A/

## (undated) DEVICE — BLCK/BITE BLOX W/DENTL/RIM W/STRAP 54F

## (undated) DEVICE — FLEX ADVANTAGE 1500CC: Brand: FLEX ADVANTAGE

## (undated) DEVICE — Device

## (undated) DEVICE — ADAPT CLN SCPE ENDO PORPOISE BX/50 DISP

## (undated) DEVICE — GOWN ,SIRUS,NONREINFORCED 3XL: Brand: MEDLINE

## (undated) DEVICE — KT ORCA ORCAPOD DISP STRL

## (undated) DEVICE — VIAL FORMLN CAP 10PCT 20ML

## (undated) DEVICE — GOWN ISOL W/THUMB UNIV BLU BX/15